# Patient Record
Sex: MALE | Race: WHITE | Employment: FULL TIME | URBAN - METROPOLITAN AREA
[De-identification: names, ages, dates, MRNs, and addresses within clinical notes are randomized per-mention and may not be internally consistent; named-entity substitution may affect disease eponyms.]

---

## 2017-02-06 ENCOUNTER — OFFICE VISIT (OUTPATIENT)
Dept: INTERNAL MEDICINE CLINIC | Age: 62
End: 2017-02-06

## 2017-02-06 VITALS
BODY MASS INDEX: 29.04 KG/M2 | WEIGHT: 214.38 LBS | HEIGHT: 72 IN | TEMPERATURE: 98.3 F | OXYGEN SATURATION: 98 % | RESPIRATION RATE: 18 BRPM | DIASTOLIC BLOOD PRESSURE: 91 MMHG | HEART RATE: 78 BPM | SYSTOLIC BLOOD PRESSURE: 124 MMHG

## 2017-02-06 DIAGNOSIS — I10 ESSENTIAL HYPERTENSION: ICD-10-CM

## 2017-02-06 DIAGNOSIS — M54.5 LOW BACK PAIN, UNSPECIFIED BACK PAIN LATERALITY, UNSPECIFIED CHRONICITY, WITH SCIATICA PRESENCE UNSPECIFIED: ICD-10-CM

## 2017-02-06 DIAGNOSIS — E11.9 TYPE 2 DIABETES MELLITUS WITH HEMOGLOBIN A1C GOAL OF LESS THAN 7.0% (HCC): Primary | ICD-10-CM

## 2017-02-06 LAB — HBA1C MFR BLD HPLC: 6.1 %

## 2017-02-06 RX ORDER — DICLOFENAC SODIUM 75 MG/1
75 TABLET, DELAYED RELEASE ORAL 2 TIMES DAILY
Qty: 50 TAB | Refills: 1 | Status: SHIPPED | OUTPATIENT
Start: 2017-02-06 | End: 2017-08-07 | Stop reason: ALTCHOICE

## 2017-02-06 NOTE — PROGRESS NOTES
HISTORY OF PRESENT ILLNESS  Licha Bradley is a 62 y.o. male. HPI  Diabetes:  He is here for follow up of diabetes. Proteinuria: no  Neuropathy: no  Medication change since last visit:  NO   Diabetic Review of Systems - medication compliance: compliant all of the time, diabetic diet compliance: compliant most of the time. Lab Results   Component Value Date/Time    Hemoglobin A1c (POC) 6.1 02/06/2017 04:30 AM     Lab Results   Component Value Date/Time    Microalb/Creat ratio (ug/mg creat.) 18.5 09/12/2016 08:33 AM    Microalbumin, urine 46.7 09/12/2016 08:33 AM         Last Point of Care HGB A1C  Hemoglobin A1c (POC)   Date Value Ref Range Status   02/06/2017 6.1 % Final        Hx of rarely recurrent low back pain. voltaren for 1-2 days during exacerbations has helped him dramatically. Using little of the year. Needs refill. ROS    Physical Exam  Visit Vitals    BP (!) 124/91 (BP 1 Location: Left arm, BP Patient Position: Sitting)    Pulse 78    Temp 98.3 °F (36.8 °C) (Oral)    Resp 18    Ht 6' 0.32\" (1.837 m)    Wt 214 lb 6 oz (97.2 kg)    SpO2 98%    BMI 28.82 kg/m2     ASSESSMENT and PLAN  Samuel Singh was seen today for diabetes. Diagnoses and all orders for this visit:    Type 2 diabetes mellitus with hemoglobin A1c goal of less than 7.0% ([facility]) - Well controlled and stable. his medications were reviewed and refilled where necessary as noted below. Labs ordered as noted. -     AMB POC HEMOGLOBIN A1C    Essential hypertension - planning 10 lb or more wt loss. Log blood pressures at home while sitting, relaxed 3-5 times weekly and bring to next visit. Pt educated on goal BP of 130/80 on average or lower. Call office as soon as possible if BP's over 140/90 or below 110/50 on multiple occasions and/or with symptoms of dizziness, chest pain, shortness of breath, headache or ankle swelling. Recheck log and bp here in 6 month(s).       Low back pain, unspecified back pain laterality, unspecified chronicity, with sciatica presence unspecified  -     diclofenac EC (VOLTAREN) 75 mg EC tablet; Take 1 Tab by mouth two (2) times a day. Follow-up Disposition:  Return in about 6 months (around 8/6/2017).

## 2017-02-06 NOTE — MR AVS SNAPSHOT
Visit Information Date & Time Provider Department Dept. Phone Encounter #  
 2/6/2017  4:15 PM Garrett Quiroz MD [facility] [address] [id number] Follow-up Instructions Return in about 6 months (around 8/6/2017). Upcoming Health Maintenance Date Due Hepatitis C Screening 1955 FOOT EXAM Q1 6/11/1966 EYE EXAM RETINAL OR DILATED Q1 6/11/1966 DTaP/Tdap/Td series (1 - Tdap) 6/11/1977 FOBT Q 1 YEAR AGE 50-75 6/11/2006 ZOSTER VACCINE AGE 60> 6/11/2016 INFLUENZA AGE 9 TO ADULT 8/1/2016 HEMOGLOBIN A1C Q6M 3/12/2017 MICROALBUMIN Q1 9/12/2017 LIPID PANEL Q1 9/12/2017 Allergies as of 2/6/2017  Review Complete On: 2/6/2017 By: Atiya Butler LPN No Known Allergies Current Immunizations  Reviewed on 10/31/2016 No immunizations on file. Not reviewed this visit You Were Diagnosed With   
  
 Codes Comments Type 2 diabetes mellitus with hemoglobin A1c goal of less than 7.0% ([facility])    -  Primary ICD-10-CM: E11.9 ICD-9-CM: 250.00 Essential hypertension     ICD-10-CM: I10 
ICD-9-CM: 401.9 Low back pain, unspecified back pain laterality, unspecified chronicity, with sciatica presence unspecified     ICD-10-CM: M54.5 ICD-9-CM: 724.2 Vitals BP Pulse Temp Resp Height(growth percentile) Weight(growth percentile) (!) 124/91 (BP 1 Location: Left arm, BP Patient Position: Sitting) 78 98.3 °F (36.8 °C) (Oral) 18 6' 0.32\" (1.837 m) 214 lb 6 oz (97.2 kg) SpO2 BMI Smoking Status 98% 28.82 kg/m2 Never Smoker Vitals History BMI and BSA Data Body Mass Index Body Surface Area  
 28.82 kg/m 2 2.23 m 2 [facility], [address] [place], [place] [address]vd AT [address] [place] [address] Drive [phone] Your Updated Medication List  
  
   
 This list is accurate as of: 2/6/17  4:41 PM.  Always use your most recent med list.  
  
  
  
  
 aspirin delayed-release 81 mg tablet Take 1 Tab by mouth daily. diclofenac EC 75 mg EC tablet Commonly known as:  VOLTAREN Take 1 Tab by mouth two (2) times a day. glucose blood VI test strips strip Commonly known as:  [address] Test Fasting Blood Sugar Once Daily. Dx E11.9  
  
 insulin glargine 100 unit/mL (3 mL) pen Commonly known as:  LANTUS SOLOSTAR  
25 Units by SubCUTAneous route once over twenty-four (24) hours. For ICD-10: E11.65 Insulin Needles (Disposable) 32 gauge x 5/32\" Ndle Commonly known as:  Jade Pen Needle For testing once a day for Dx: E11.9 Lancets Misc Commonly known as:  ACCU-CHEK FASTCLIX Test Fasting Blood Sugar Once Daily Prescriptions Sent to Pharmacy Refills  
 diclofenac EC (VOLTAREN) 75 mg EC tablet 1 Sig: Take 1 Tab by mouth two (2) times a day. Class: Normal  
 Pharmacy: [facility] [address] AT [address] [place] [address] #: [phone] Route: Oral  
  
We Performed the Following AMB POC HEMOGLOBIN A1C [17631 CPT(R)] Follow-up Instructions Return in about 6 months (around 8/6/2017). Introducing [facility] & [facility]! Dear Nabeel Harris: Thank you for requesting a [employer] account. Our records indicate that you already have an active [employer] account. You can access your account anytime at [email or web address] Did you know that you can access your hospital and ER discharge instructions at any time in Hasht[employer]? You can also review all of your test results from your hospital stay or ER visit. Additional Information If you have questions, please visit the Frequently Asked Questions section of the Hashtago website at [email or web address] Remember, MyChart is NOT to be used for urgent needs. For medical emergencies, dial 911. Now available from your iPhone and Android! Please provide this summary of care documentation to your next provider. Your primary care clinician is listed as Adamaris Mcekon. If you have any questions after today's visit, please call [phone].

## 2017-08-07 ENCOUNTER — OFFICE VISIT (OUTPATIENT)
Dept: INTERNAL MEDICINE CLINIC | Age: 62
End: 2017-08-07

## 2017-08-07 VITALS
WEIGHT: 211.25 LBS | RESPIRATION RATE: 18 BRPM | HEIGHT: 72 IN | OXYGEN SATURATION: 98 % | DIASTOLIC BLOOD PRESSURE: 93 MMHG | TEMPERATURE: 98.3 F | HEART RATE: 86 BPM | BODY MASS INDEX: 28.61 KG/M2 | SYSTOLIC BLOOD PRESSURE: 118 MMHG

## 2017-08-07 DIAGNOSIS — E11.9 TYPE 2 DIABETES MELLITUS WITH HEMOGLOBIN A1C GOAL OF LESS THAN 7.0% (HCC): Primary | ICD-10-CM

## 2017-08-07 DIAGNOSIS — R03.0 ELEVATED BP WITHOUT DIAGNOSIS OF HYPERTENSION: ICD-10-CM

## 2017-08-07 LAB — HBA1C MFR BLD HPLC: 6 %

## 2017-08-07 RX ORDER — INSULIN GLARGINE 100 [IU]/ML
25 INJECTION, SOLUTION SUBCUTANEOUS DAILY
Qty: 5 PEN | Refills: 4 | Status: SHIPPED | OUTPATIENT
Start: 2017-08-07 | End: 2017-10-25 | Stop reason: CLARIF

## 2017-08-07 NOTE — PROGRESS NOTES
HISTORY OF PRESENT ILLNESS  Luz Escalera is a 65 y.o. male. HPI  Diabetes:  He is here for follow up of diabetes. Proteinuria: no  Neuropathy: no  Medication change since last visit:  No   Diabetic Review of Systems - medication compliance: compliant all of the time, diabetic diet compliance: compliant most of the time, home glucose monitoring: is performed regularly, fasting values range 130. Hypoglycemic symptoms: no        Lab Results   Component Value Date/Time    Hemoglobin A1c (POC) 6.0 08/07/2017 04:20 AM     Lab Results   Component Value Date/Time    Microalb/Creat ratio (ug/mg creat.) 18.5 09/12/2016 08:33 AM         Last Point of Care HGB A1C  Hemoglobin A1c (POC)   Date Value Ref Range Status   08/07/2017 6.0 % Final       ROS    Physical Exam   Constitutional: He appears well-developed and well-nourished. No distress. BP (!) 118/93 (BP 1 Location: Left arm, BP Patient Position: Sitting)  Pulse 86  Temp 98.3 °F (36.8 °C) (Oral)   Resp 18  Ht 6' 0.32\" (1.837 m)  Wt 211 lb 4 oz (95.8 kg)  SpO2 98%  BMI 28.4 kg/m2Body mass index is 28.4 kg/(m^2). HENT:   Mouth/Throat: Oropharynx is clear and moist.   Neck: No JVD present. Carotid bruit is not present. Cardiovascular: Normal rate, regular rhythm, normal heart sounds and intact distal pulses. Pulmonary/Chest: Effort normal and breath sounds normal.   Musculoskeletal: He exhibits no edema. Neurological: He is alert. Skin: Skin is warm and dry. He is not diaphoretic. Nursing note and vitals reviewed. ASSESSMENT and PLAN  Diagnoses and all orders for this visit:    1. Type 2 diabetes mellitus with hemoglobin A1c goal of less than 7.0% ([facility])  -     AMB POC HEMOGLOBIN A1C  -     insulin glargine (BASAGLAR KWIKPEN) 100 unit/mL (3 mL) inpn; 25 Units by SubCUTAneous route daily. Counseled on low carb diet. Work toward wt loss. We also discussed indications for statin in diabetics for risk reduction.   He is very hesitant to add new medications at this time but will discuss again in future. 2. Elevated BP without diagnosis of hypertension - Log blood pressures at home while sitting, relaxed 3-5 times weekly and bring to next visit. Pt educated on goal BP of 130/80 on average or lower. Call office as soon as possible if BP's over 140/90 or below 110/50 on multiple occasions and/or with symptoms of dizziness, chest pain, shortness of breath, headache or ankle swelling. Recheck log and bp here in 6 month(s). Work on wt loss. Follow-up Disposition:  Return in about 6 months (around 2/7/2018).

## 2017-08-07 NOTE — MR AVS SNAPSHOT
Visit Information Date & Time Provider Department Dept. Phone Encounter #  
 8/7/2017  4:15 PM Davin Luna MD [facility] [address] [id number] Follow-up Instructions Return in about 6 months (around 2/7/2018). Upcoming Health Maintenance Date Due Hepatitis C Screening 1955 FOOT EXAM Q1 6/11/1966 EYE EXAM RETINAL OR DILATED Q1 6/11/1966 DTaP/Tdap/Td series (1 - Tdap) 6/11/1977 FOBT Q 1 YEAR AGE 50-75 6/11/2006 ZOSTER VACCINE AGE 60> 4/11/2016 INFLUENZA AGE 9 TO ADULT 8/1/2017 HEMOGLOBIN A1C Q6M 8/6/2017 MICROALBUMIN Q1 9/12/2017 LIPID PANEL Q1 9/12/2017 Allergies as of 8/7/2017  Review Complete On: 8/7/2017 By: Eugenio Jeronimo LPN No Known Allergies Current Immunizations  Reviewed on 10/31/2016 No immunizations on file. Not reviewed this visit You Were Diagnosed With   
  
 Codes Comments Type 2 diabetes mellitus with hemoglobin A1c goal of less than 7.0% ([facility])    -  Primary ICD-10-CM: E11.9 ICD-9-CM: 250.00 Elevated BP without diagnosis of hypertension     ICD-10-CM: R03.0 ICD-9-CM: 796.2 Vitals BP Pulse Temp Resp Height(growth percentile) Weight(growth percentile) (!) 118/93 (BP 1 Location: Left arm, BP Patient Position: Sitting) 86 98.3 °F (36.8 °C) (Oral) 18 6' 0.32\" (1.837 m) 211 lb 4 oz (95.8 kg) SpO2 BMI Smoking Status 98% 28.4 kg/m2 Never Smoker Vitals History BMI and BSA Data Body Mass Index Body Surface Area  
 28.4 kg/m 2 2.21 m 2 Preferred Pharmacy Pharmacy Name Phone [facility] DRUG STORE [facility], [address] [place] [address] AT [facility] [address] [phone] Your Updated Medication List  
  
   
This list is accurate as of: 8/7/17  5:04 PM.  Always use your most recent med list.  
  
  
  
  
 aspirin delayed-release 81 mg tablet Take 1 Tab by mouth daily. glucose blood VI test strips strip Commonly known as:  [address] Test Fasting Blood Sugar Once Daily. Dx E11.9  
  
 insulin glargine 100 unit/mL (3 mL) Inpn Commonly known as:  BASAGLAR KWIKPEN  
25 Units by SubCUTAneous route daily. Lancets Misc Commonly known as:  ACCU-CHEK FASTCLIX Test Fasting Blood Sugar Once Daily Jade Pen Needle 32 gauge x 5/32\" Ndle Generic drug:  Insulin Needles (Disposable) USE ONCE A DAY AS DIRECTED Prescriptions Sent to Pharmacy Refills  
 insulin glargine (BASAGLAR KWIKPEN) 100 unit/mL (3 mL) inpn 4 Si Units by SubCUTAneous route daily. Class: Normal  
 [facility]  AT [address] Ph #: [phone] Route: SubCUTAneous We Performed the Following AMB POC HEMOGLOBIN A1C [72463 CPT(R)] Follow-up Instructions Return in about 6 months (around 2018). Introducing [facility]! Dear Carey Scott: Thank you for requesting a [employer] account. Our records indicate that you already have an active [employer] account. You can access your account anytime at [email or web address] Did you know that you can access your hospital and ER discharge instructions at any time in [employer]? You can also review all of your test results from your hospital stay or ER visit. Additional Information If you have questions, please visit the Frequently Asked Questions section of the [employer] website at [email or web address] Remember, [employer] is NOT to be used for urgent needs. For medical emergencies, dial 911. Now available from your iPhone and Android! Please provide this summary of care documentation to your next provider. Your primary care clinician is listed as Abimbola Almeida. If you have any questions after today's visit, please call [phone].

## 2017-09-23 ENCOUNTER — HOSPITAL ENCOUNTER (EMERGENCY)
Age: 62
Discharge: HOME | End: 2017-09-23
Attending: FAMILY MEDICINE

## 2017-09-23 VITALS
HEIGHT: 72 IN | WEIGHT: 211 LBS | TEMPERATURE: 97.5 F | BODY MASS INDEX: 28.58 KG/M2 | SYSTOLIC BLOOD PRESSURE: 131 MMHG | HEART RATE: 88 BPM | OXYGEN SATURATION: 94 % | RESPIRATION RATE: 16 BRPM | DIASTOLIC BLOOD PRESSURE: 80 MMHG

## 2017-09-23 DIAGNOSIS — M10.071 ACUTE IDIOPATHIC GOUT OF RIGHT FOOT: Primary | ICD-10-CM

## 2017-09-23 RX ORDER — INDOMETHACIN 50 MG/1
50 CAPSULE ORAL
Qty: 30 CAP | Refills: 0 | Status: SHIPPED | OUTPATIENT
Start: 2017-09-23 | End: 2017-10-31 | Stop reason: ALTCHOICE

## 2017-09-23 RX ORDER — COLCHICINE 0.6 MG/1
CAPSULE ORAL
Qty: 3 CAP | Refills: 0 | Status: SHIPPED | OUTPATIENT
Start: 2017-09-23 | End: 2017-10-31 | Stop reason: SDUPTHER

## 2017-09-23 NOTE — DISCHARGE INSTRUCTIONS
Gout: Care Instructions  Your Care Instructions  Gout is a form of arthritis caused by a buildup of uric acid crystals in a joint. It causes sudden attacks of pain, swelling, redness, and stiffness, usually in one joint, especially the big toe. Gout usually comes on without a cause. But it can be brought on by drinking alcohol (especially beer) or eating seafood and red meat. Taking certain medicines, such as diuretics or aspirin, also can bring on an attack of gout. Taking your medicines as prescribed and following up with your doctor regularly can help you avoid gout attacks in the future. Follow-up care is a key part of your treatment and safety. Be sure to make and go to all appointments, and call your doctor if you are having problems. Its also a good idea to know your test results and keep a list of the medicines you take. How can you care for yourself at home? · If the joint is swollen, put ice or a cold pack on the area for 10 to 20 minutes at a time. Put a thin cloth between the ice and your skin. · Prop up the sore limb on a pillow when you ice it or anytime you sit or lie down during the next 3 days. Try to keep it above the level of your heart. This will help reduce swelling. · Rest sore joints. Avoid activities that put weight or strain on the joints for a few days. Take short rest breaks from your regular activities during the day. · Take your medicines exactly as prescribed. Call your doctor if you think you are having a problem with your medicine. · Take pain medicines exactly as directed. ¨ If the doctor gave you a prescription medicine for pain, take it as prescribed. ¨ If you are not taking a prescription pain medicine, ask your doctor if you can take an over-the-counter medicine. · Eat less seafood and red meat. · Check with your doctor before drinking alcohol. · Losing weight, if you are overweight, may help reduce attacks of gout. But do not go on a [employer]. \" Losing a lot of weight in a short amount of time can cause a gout attack. When should you call for help? Call your doctor now or seek immediate medical care if:  · You have a fever. · The joint is so painful you cannot use it. · You have sudden, unexplained swelling, redness, warmth, or severe pain in one or more joints. Watch closely for changes in your health, and be sure to contact your doctor if:  · You have joint pain. · Your symptoms get worse or are not improving after 2 or 3 days. Where can you learn more? Go to http://elizabeth-luke.info/. Enter U310 in the search box to learn more about \"Gout: Care Instructions. \"  Current as of: October 31, 2016  Content Version: 11.3  © 7834-0579 [employer]. Care instructions adapted under license by [employer] (which disclaims liability or warranty for this information). If you have questions about a medical condition or this instruction, always ask your healthcare professional. [place] [address] any warranty or liability for your use of this information. Purine-Restricted Diet: Care Instructions  Your Care Instructions  Purines are substances that are found in some foods. Your body turns purines into uric acid. High levels of uric acid can cause gout, which is a form of arthritis that causes pain and inflammation in joints. You may be able to help control the amount of uric acid in your body by limiting high-purine foods in your diet. Follow-up care is a key part of your treatment and safety. Be sure to make and go to all appointments, and call your doctor if you are having problems. It's also a good idea to know your test results and keep a list of the medicines you take. How can you care for yourself at home? · Plan your meals and snacks around foods that are low in purines and are safe for you to eat. These foods include:  ¨ Green vegetables and tomatoes. ¨ Fruits.   ¨ Whole-grain breads, rice, and cereals. ¨ Eggs, peanut butter, and nuts. ¨ Low-fat milk, cheese, and other milk products. ¨ Popcorn. ¨ Gelatin desserts, chocolate, cocoa, and cakes and sweets, in small amounts. · You can eat certain foods that are medium-high in purines, but eat them only once in a while. These foods include:  ¨ Legumes, such as dried beans and dried peas. You can have 1 cup cooked legumes each day. ¨ Asparagus, cauliflower, spinach, mushrooms, and green peas. ¨ Fish and seafood (other than very high-purine seafood). ¨ Oatmeal, wheat bran, and wheat germ. · Limit very high-purine foods, including:  ¨ Organ meats, such as liver, kidneys, sweetbreads, and brains. ¨ Meats, including brown, beef, pork, and lamb. ¨ Game meats and any other meats in large amounts. ¨ Anchovies, sardines, herring, mackerel, and scallops. ¨ Gravy. ¨ Beer. Where can you learn more? Go to http://elizabeth-luke.info/. Enter F448 in the search box to learn more about \"Purine-Restricted Diet: Care Instructions. \"  Current as of: July 26, 2016  Content Version: 11.3  © 0658-4442 [employer]. Care instructions adapted under license by [employer] (which disclaims liability or warranty for this information). If you have questions about a medical condition or this instruction, always ask your healthcare professional. [place] [address] any warranty or liability for your use of this information.

## 2017-09-24 NOTE — UC PROVIDER NOTE
Patient is a 65 y.o. male presenting with foot pain. The history is provided by the patient. Foot Pain    This is a recurrent (hx of gout; ate a dozen oysters prior to onset) problem. The current episode started yesterday. The problem occurs constantly. The problem has been gradually worsening. Pain location: right MTP joint. The quality of the pain is described as aching. The pain is moderate. Associated symptoms include limited range of motion and stiffness. The symptoms are aggravated by movement and contact. He has tried nothing for the symptoms. There has been no history of extremity trauma. Past Medical History:   Diagnosis Date    Diabetes ([facility]     Hypertension         Past Surgical History:   Procedure Laterality Date    HX HERNIA REPAIR      R inguinal         Family History   Problem Relation Age of Onset    Diabetes Father     Hypertension Father     Cancer Mother      breast/lung    Hypertension Sister     Hypertension Brother     Elevated Lipids Neg Hx         Social History     Social History    Marital status:      Spouse name: N/A    Number of children: 3    Years of education: N/A     Occupational History    Not on file. Social History Main Topics    Smoking status: Never Smoker    Smokeless tobacco: Current User     Types: Snuff      Comment: dip/everyday     Alcohol use 0.0 oz/week     0 Standard drinks or equivalent per week      Comment: rarely     Drug use: No    Sexual activity: Yes     Partners: Female     Other Topics Concern    Not on file     Social History Narrative                ALLERGIES: Review of patient's allergies indicates no known allergies. Review of Systems   Constitutional: Negative for chills and fever. Respiratory: Negative for shortness of breath and wheezing. Cardiovascular: Negative for chest pain and palpitations. Gastrointestinal: Negative for nausea and vomiting.    Musculoskeletal: Positive for arthralgias, joint swelling and stiffness. Skin: Positive for color change. Neurological: Negative for dizziness and headaches. Vitals:    09/23/17 1944   BP: 131/80   Pulse: 88   Resp: 16   Temp: 97.5 °F (36.4 °C)   SpO2: 94%   Weight: 95.7 kg (211 lb)   Height: 6' (1.829 m)       Physical Exam   Constitutional: He appears well-developed and well-nourished. No distress. Musculoskeletal:        Right foot: There is decreased range of motion (1st MTP joint), tenderness (1st MTP joint), bony tenderness (1st MTP joint) and swelling (1st MTP joint). There is normal capillary refill, no crepitus, no deformity and no laceration. Feet:    Neurological: He is alert. Skin: He is not diaphoretic. Psychiatric: He has a normal mood and affect. His behavior is normal. Judgment and thought content normal.   Nursing note and vitals reviewed. MDM     Differential Diagnosis; Clinical Impression; Plan:     CLINICAL IMPRESSION:  Acute idiopathic gout of right foot  (primary encounter diagnosis)    Plan:  1. Colchicine  2. Indocin prn  3. F/u with pcp  Risk of Significant Complications, Morbidity, and/or Mortality:   Presenting problems: Moderate  Management options:   Moderate  Progress:   Patient progress:  Stable      Procedures

## 2017-10-25 ENCOUNTER — TELEPHONE (OUTPATIENT)
Dept: INTERNAL MEDICINE CLINIC | Age: 62
End: 2017-10-25

## 2017-10-25 RX ORDER — INSULIN GLARGINE 100 [IU]/ML
25 INJECTION, SOLUTION SUBCUTANEOUS DAILY
Qty: 15 ML | Refills: 1 | Status: SHIPPED | OUTPATIENT
Start: 2017-10-25 | End: 2018-10-26 | Stop reason: SDUPTHER

## 2017-10-25 NOTE — TELEPHONE ENCOUNTER
Per pharmacist, Basaglar 100 units/mL is not coved by patients insurance. Prior authorization was denied by [employer]. New medication alternative recommended.

## 2017-10-31 ENCOUNTER — OFFICE VISIT (OUTPATIENT)
Dept: INTERNAL MEDICINE CLINIC | Age: 62
End: 2017-10-31

## 2017-10-31 VITALS
BODY MASS INDEX: 28.63 KG/M2 | SYSTOLIC BLOOD PRESSURE: 134 MMHG | HEIGHT: 73 IN | TEMPERATURE: 98.6 F | HEART RATE: 84 BPM | DIASTOLIC BLOOD PRESSURE: 88 MMHG | RESPIRATION RATE: 18 BRPM | OXYGEN SATURATION: 97 % | WEIGHT: 216 LBS

## 2017-10-31 DIAGNOSIS — Z12.11 COLON CANCER SCREENING: ICD-10-CM

## 2017-10-31 DIAGNOSIS — Z00.00 PREVENTATIVE HEALTH CARE: Primary | ICD-10-CM

## 2017-10-31 DIAGNOSIS — Z11.59 ENCOUNTER FOR HEPATITIS C SCREENING TEST FOR LOW RISK PATIENT: ICD-10-CM

## 2017-10-31 DIAGNOSIS — I10 ESSENTIAL HYPERTENSION: ICD-10-CM

## 2017-10-31 DIAGNOSIS — E11.9 TYPE 2 DIABETES MELLITUS WITH HEMOGLOBIN A1C GOAL OF LESS THAN 7.0% (HCC): ICD-10-CM

## 2017-10-31 RX ORDER — COLCHICINE 0.6 MG/1
TABLET ORAL
Refills: 0 | COMMUNITY
Start: 2017-09-23 | End: 2017-10-31 | Stop reason: ALTCHOICE

## 2017-10-31 NOTE — MR AVS SNAPSHOT
Visit Information Date & Time Provider Department Dept. Phone Encounter #  
 10/31/2017  2:30 PM Trupti Mendoza MD [facility] DONAVAN Ramos [id number] Follow-up Instructions Return in about 4 months (around 2/28/2018). Your Appointments 2/6/2018  4:00 PM  
ROUTINE CARE with Trupti Mendoza MD  
[facility] Appt Note: [address] [id number]  
[phone]  
  
   
 [address] [facility] [address] Upcoming Health Maintenance Date Due Hepatitis C Screening 1955 FOOT EXAM Q1 6/11/1966 EYE EXAM RETINAL OR DILATED Q1 6/11/1966 COLONOSCOPY 6/11/1974 DTaP/Tdap/Td series (1 - Tdap) 6/11/1977 MICROALBUMIN Q1 9/12/2017 LIPID PANEL Q1 9/12/2017 HEMOGLOBIN A1C Q6M 2/7/2018 Allergies as of 10/31/2017  Review Complete On: 10/31/2017 By: Trupti Mendoza MD  
 No Known Allergies Current Immunizations  Reviewed on 10/31/2017 No immunizations on file. Reviewed by Trupti Mendoza MD on 10/31/2017 at  2:50 PM  
 Reviewed by Trupti Mendoza MD on 10/31/2017 at  2:54 PM  
You Were Diagnosed With   
  
 Codes Comments Preventative health care    -  Primary ICD-10-CM: Z00.00 ICD-9-CM: V70.0 Type 2 diabetes mellitus with hemoglobin A1c goal of less than 7.0% (HCC)     ICD-10-CM: E11.9 ICD-9-CM: 250.00 Essential hypertension     ICD-10-CM: I10 
ICD-9-CM: 401.9 Encounter for hepatitis C screening test for low risk patient     ICD-10-CM: Z11.59 
ICD-9-CM: V73.89 Colon cancer screening     ICD-10-CM: Z12.11 ICD-9-CM: V76.51 Vitals BP Pulse Temp Resp Height(growth percentile) Weight(growth percentile) 134/88 84 98.6 °F (37 °C) (Oral) 18 6' 1\" (1.854 m) 216 lb (98 kg) SpO2 BMI Smoking Status 97% 28.5 kg/m2 Never Smoker Vitals History BMI and BSA Data Body Mass Index Body Surface Area 28.5 kg/m 2 2.25 m 2 Preferred Pharmacy Pharmacy Name Phone [facility], [address] AT [address] [place] [address] Drive [phone] Your Updated Medication List  
  
   
This list is accurate as of: 10/31/17  3:08 PM.  Always use your most recent med list.  
  
  
  
  
 aspirin delayed-release 81 mg tablet Take 1 Tab by mouth daily. glucose blood VI test strips strip Commonly known as:  [address] Test Fasting Blood Sugar Once Daily. Dx E11.9  
  
 insulin glargine 100 unit/mL (3 mL) Inpn Commonly known as:  LANTUS,BASAGLAR  
25 Units by SubCUTAneous route daily. Lancets Misc Commonly known as:  ACCU-CHEK FASTCLIX Test Fasting Blood Sugar Once Daily Jade Pen Needle 32 gauge x 5/32\" Ndle Generic drug:  Insulin Needles (Disposable) USE ONCE A DAY AS DIRECTED We Performed the Following HEMOGLOBIN A1C WITH EAG [08150 CPT(R)] HEPATITIS C AB [46251 CPT(R)] LIPID PANEL [47353 CPT(R)] METABOLIC PANEL, BASIC [38796 CPT(R)] MICROALBUMIN, UR, RAND W/ MICROALBUMIN/CREA RATIO [id number] CPT(R)] PSA, DIAGNOSTIC (PROSTATE SPECIFIC AG) [id number] CPT(R)] REFERRAL TO GASTROENTEROLOGY [GXX51 Custom] Follow-up Instructions Return in about 4 months (around 2/28/2018). Referral Information Referral ID Referred By Referred To  
  
 [id number] [facility], [address] [address] [place], [address] Visits Status Start Date End Date 1 New Request 10/31/17 10/31/18 If your referral has a status of pending review or denied, additional information will be sent to support the outcome of this decision. Patient Instructions   
[employer] Well Visit, Men 48 to 72: Care Instructions Your Care Instructions Physical exams can help you stay healthy. Your doctor has checked your overall health and may have suggested ways to take good care of yourself. He or she also may have recommended tests. At home, you can help prevent illness with healthy eating, regular exercise, and other steps. Follow-up care is a key part of your treatment and safety. Be sure to make and go to all appointments, and call your doctor if you are having problems. It's also a good idea to know your test results and keep a list of the medicines you take. How can you care for yourself at home? · Reach and stay at a healthy weight. This will lower your risk for many problems, such as obesity, diabetes, heart disease, and high blood pressure. · Get at least 30 minutes of exercise on most days of the week. Walking is a good choice. You also may want to do other activities, such as running, swimming, cycling, or playing tennis or team sports. · Do not smoke. Smoking can make health problems worse. If you need help quitting, talk to your doctor about stop-smoking programs and medicines. These can increase your chances of quitting for good. · Protect your skin from too much sun. When you're outdoors from 10 a.m. to 4 p.m., stay in the shade or cover up with clothing and a hat with a wide brim. Wear sunglasses that block UV rays. Even when it's cloudy, put broad-spectrum sunscreen (SPF 30 or higher) on any exposed skin. · See a dentist one or two times a year for checkups and to have your teeth cleaned. · Wear a seat belt in the car. · Limit alcohol to 2 drinks a day. Too much alcohol can cause health problems. Follow your doctor's advice about when to have certain tests. These tests can spot problems early. · Cholesterol. Your doctor will tell you how often to have this done based on your overall health and other things that can increase your risk for heart attack and stroke. · Blood pressure. Have your blood pressure checked during a routine doctor visit. Your doctor will tell you how often to check your blood pressure based on your age, your blood pressure results, and other factors. · Prostate exam. Talk to your doctor about whether you should have a blood test (called a PSA test) for prostate cancer. Experts disagree on whether men should have this test. Some experts recommend that you discuss the benefits and risks of the test with your doctor. · Diabetes. Ask your doctor whether you should have tests for diabetes. · Vision. Some experts recommend that you have yearly exams for glaucoma and other age-related eye problems starting at age 48. · Hearing. Tell your doctor if you notice any change in your hearing. You can have tests to find out how well you hear. · Colon cancer. You should begin tests for colon cancer at age 48. You may have one of several tests. Your doctor will tell you how often to have tests based on your age and risk. Risks include whether you already had a precancerous polyp removed from your colon or whether your parent, brother, sister, or child has had colon cancer. · Heart attack and stroke risk. At least every 4 to 6 years, you should have your risk for heart attack and stroke assessed. Your doctor uses factors such as your age, blood pressure, cholesterol, and whether you smoke or have diabetes to show what your risk for a heart attack or stroke is over the next 10 years. · Abdominal aortic aneurysm. Ask your doctor whether you should have a test to check for an aneurysm. You may need a test if you ever smoked or if your parent, brother, sister, or child has had an aneurysm. When should you call for help? Watch closely for changes in your health, and be sure to contact your doctor if you have any problems or symptoms that concern you. Where can you learn more? Go to http://elizabeth-luke.info/. Enter O670 in the search box to learn more about \"Well Visit, Men 48 to 72: Care Instructions. \" Current as of: May 12, 2017 Content Version: 11.4 © 0132-1272 [employer] [place]. Care instructions adapted under license by [employer] (which disclaims liability or warranty for this information). If you have questions about a medical condition or this instruction, always ask your healthcare professional. [address] any warranty or liability for your use of this information. Introducing \[facility] [place] [facility]\"" & HEALTH SERVICES! Dear Shaquille Pat: Thank you for requesting a NuPathe account. Our records indicate that you already have an active NuPathe account. You can access your account anytime at [email or web address] Did you know that you can access your hospital and ER discharge instructions at any time in NuPathe? You can also review all of your test results from your hospital stay or ER visit. Additional Information If you have questions, please visit the Frequently Asked Questions section of the NuPathe website at [email or web address] Remember, NuPathe is NOT to be used for urgent needs. For medical emergencies, dial 911. Now available from your iPhone and Android! Please provide this summary of care documentation to your next provider. Your primary care clinician is listed as Emerald Adame. If you have any questions after today's visit, please call [phone].

## 2017-10-31 NOTE — PATIENT INSTRUCTIONS
mobiliThink     Well Visit, Men 48 to 72: Care Instructions  Your Care Instructions    Physical exams can help you stay healthy. Your doctor has checked your overall health and may have suggested ways to take good care of yourself. He or she also may have recommended tests. At home, you can help prevent illness with healthy eating, regular exercise, and other steps. Follow-up care is a key part of your treatment and safety. Be sure to make and go to all appointments, and call your doctor if you are having problems. It's also a good idea to know your test results and keep a list of the medicines you take. How can you care for yourself at home? · Reach and stay at a healthy weight. This will lower your risk for many problems, such as obesity, diabetes, heart disease, and high blood pressure. · Get at least 30 minutes of exercise on most days of the week. Walking is a good choice. You also may want to do other activities, such as running, swimming, cycling, or playing tennis or team sports. · Do not smoke. Smoking can make health problems worse. If you need help quitting, talk to your doctor about stop-smoking programs and medicines. These can increase your chances of quitting for good. · Protect your skin from too much sun. When you're outdoors from 10 a.m. to 4 p.m., stay in the shade or cover up with clothing and a hat with a wide brim. Wear sunglasses that block UV rays. Even when it's cloudy, put broad-spectrum sunscreen (SPF 30 or higher) on any exposed skin. · See a dentist one or two times a year for checkups and to have your teeth cleaned. · Wear a seat belt in the car. · Limit alcohol to 2 drinks a day. Too much alcohol can cause health problems. Follow your doctor's advice about when to have certain tests. These tests can spot problems early. · Cholesterol.  Your doctor will tell you how often to have this done based on your overall health and other things that can increase your risk for heart attack and stroke. · Blood pressure. Have your blood pressure checked during a routine doctor visit. Your doctor will tell you how often to check your blood pressure based on your age, your blood pressure results, and other factors. · Prostate exam. Talk to your doctor about whether you should have a blood test (called a PSA test) for prostate cancer. Experts disagree on whether men should have this test. Some experts recommend that you discuss the benefits and risks of the test with your doctor. · Diabetes. Ask your doctor whether you should have tests for diabetes. · Vision. Some experts recommend that you have yearly exams for glaucoma and other age-related eye problems starting at age 48. · Hearing. Tell your doctor if you notice any change in your hearing. You can have tests to find out how well you hear. · Colon cancer. You should begin tests for colon cancer at age 48. You may have one of several tests. Your doctor will tell you how often to have tests based on your age and risk. Risks include whether you already had a precancerous polyp removed from your colon or whether your parent, brother, sister, or child has had colon cancer. · Heart attack and stroke risk. At least every 4 to 6 years, you should have your risk for heart attack and stroke assessed. Your doctor uses factors such as your age, blood pressure, cholesterol, and whether you smoke or have diabetes to show what your risk for a heart attack or stroke is over the next 10 years. · Abdominal aortic aneurysm. Ask your doctor whether you should have a test to check for an aneurysm. You may need a test if you ever smoked or if your parent, brother, sister, or child has had an aneurysm. When should you call for help? Watch closely for changes in your health, and be sure to contact your doctor if you have any problems or symptoms that concern you. Where can you learn more? Go to http://elizabeth-luke.info/.   Enter L585 in the search box to learn more about \"Well Visit, Men 48 to 72: Care Instructions. \"  Current as of: May 12, 2017  Content Version: 11.4  © 0991-8435 [employer]. Care instructions adapted under license by [employer] (which disclaims liability or warranty for this information). If you have questions about a medical condition or this instruction, always ask your healthcare professional. [place] [address] any warranty or liability for your use of this information.

## 2017-10-31 NOTE — PROGRESS NOTES
HISTORY OF PRESENT ILLNESS  Juana Cardenas is a 65 y.o. male. HPI  Juana Cardenas is here for complete health maintenance physical exam and screening. he does not have other concerns. Health maintenance hx includes:  Exercise: moderately active. Form of exercise: hunting, walking   Diet: generally follows a low fat low cholesterol diet, generally follows a low sodium diet, follows a diabetic diet regularly, exercises sporadically, nonsmoker    Cancer screening:    Colon cancer screening:  Last Colonoscopy: never   Prostate cancer screening: PSA and/or WAGNER:   Lab Results   Component Value Date/Time    Prostate Specific Ag 0.7 10/31/2016 12:00 PM    Prostate Specific Ag 0.5 09/04/2015 11:12 AM    Prostate Specific Ag 0.6 12/20/2012 12:00 AM          Lab Results   Component Value Date/Time    Cholesterol, total 188 09/12/2016 08:33 AM    HDL Cholesterol 43 09/12/2016 08:33 AM    LDL, calculated 126 09/12/2016 08:33 AM    VLDL, calculated 19 09/12/2016 08:33 AM    Triglyceride 96 09/12/2016 08:33 AM       Lab Results   Component Value Date/Time    Glucose 100 09/12/2016 08:33 AM    Glucose  10/09/2015 09:30 AM         Immunizations: There is no immunization history on file for this patient. Immunization status: pt declines vaccines. Social History     Social History    Marital status:      Spouse name: N/A    Number of children: 3    Years of education: N/A     Occupational History    Not on file.      Social History Main Topics    Smoking status: Never Smoker    Smokeless tobacco: Current User     Types: Snuff      Comment: dip/everyday     Alcohol use 0.0 oz/week     0 Standard drinks or equivalent per week      Comment: rarely     Drug use: No    Sexual activity: Yes     Partners: Female     Other Topics Concern    Not on file     Social History Narrative     Past Surgical History:   Procedure Laterality Date    HX HERNIA REPAIR      R inguinal     Family History   Problem Relation Age of Onset   24 [facility] Diabetes Father     Hypertension Father     Cancer Mother      breast/lung    Hypertension Sister     Hypertension Brother     Elevated Lipids Neg Hx      Current Outpatient Prescriptions on File Prior to Visit   Medication Sig Dispense Refill    insulin glargine (LANTUS,BASAGLAR) 100 unit/mL (3 mL) inpn 25 Units by SubCUTAneous route daily. 15 mL 1    MEKA PEN NEEDLE 32 gauge x 5/32\" ndle USE ONCE A DAY AS DIRECTED 100 Pen Needle 11    glucose blood VI test strips (ACCU-CHEK SMARTVIEW TEST STRIP) strip Test Fasting Blood Sugar Once Daily. Dx E11.9 100 Strip 11    Lancets (ACCU-CHEK FASTCLIX) misc Test Fasting Blood Sugar Once Daily 100 Each 11    aspirin delayed-release 81 mg tablet Take 1 Tab by mouth daily. No current facility-administered medications on file prior to visit. .    Review of Systems   Constitutional: Negative for malaise/fatigue and weight loss. Eyes: Negative for blurred vision and pain. Respiratory: Negative for cough, shortness of breath and wheezing. Cardiovascular: Negative for chest pain, palpitations and leg swelling. Gastrointestinal: Negative for blood in stool, constipation, diarrhea, heartburn, nausea and vomiting. Genitourinary: Negative. Musculoskeletal: Negative for back pain, joint pain and myalgias. Skin: Negative for rash. Neurological: Negative for dizziness and headaches. Endo/Heme/Allergies: Negative for environmental allergies. Does not bruise/bleed easily. Psychiatric/Behavioral: Negative for depression. The patient is not nervous/anxious and does not have insomnia. Physical Exam   Constitutional: He is oriented to person, place, and time. He appears well-developed and well-nourished. No distress. Body mass index is 28.5 kg/(m^2). /88  Pulse 84  Temp 98.6 °F (37 °C) (Oral)   Resp 18  Ht 6' 1\" (1.854 m)  Wt 216 lb (98 kg)  SpO2 97%  BMI 28.5 kg/m2   HENT:   Head: Normocephalic and atraumatic. Right Ear: Hearing, tympanic membrane and ear canal normal.   Left Ear: Hearing, tympanic membrane and ear canal normal.   Nose: Nose normal.   Mouth/Throat: Oropharynx is clear and moist and mucous membranes are normal. Normal dentition. Eyes: Conjunctivae and lids are normal. Pupils are equal, round, and reactive to light. Right eye exhibits no discharge. Left eye exhibits no discharge. No scleral icterus. Neck: Trachea normal. No thyromegaly present. Cardiovascular: Normal rate, regular rhythm, normal heart sounds, intact distal pulses and normal pulses. Exam reveals no gallop and no friction rub. No murmur heard. Pulmonary/Chest: Effort normal and breath sounds normal. No respiratory distress. Abdominal: Soft. Normal appearance and bowel sounds are normal. He exhibits no distension and no mass. There is no hepatosplenomegaly. There is no tenderness. There is no CVA tenderness. Musculoskeletal: Normal range of motion. He exhibits no edema or tenderness. Lymphadenopathy:     He has no cervical adenopathy. Right: No inguinal adenopathy present. Left: No inguinal adenopathy present. Neurological: He is alert and oriented to person, place, and time. Skin: Skin is warm and dry. No rash noted. He is not diaphoretic. Psychiatric: He has a normal mood and affect. His speech is normal and behavior is normal. Judgment and thought content normal. Cognition and memory are normal.   Nursing note and vitals reviewed. ASSESSMENT and PLAN  Diagnoses and all orders for this visit:    1. [facility] health care  -     LIPID PANEL  -     METABOLIC PANEL, [address] [facility]  he was advised to have follow up colonoscopy in 2017  Doris Yue was counseled on age-appropriate/ guideline-based risk prevention behaviors and screening for a 65y.o. year old   male . We also discussed adjustments in screening based on family history if necessary.    Printed instructions for preventative screening guidelines and healthy behaviors given to patient with after visit summary. 2. Type 2 diabetes mellitus with hemoglobin A1c goal of less than 7.0% ([facility]) -recheck labs  -     MICROALBUMIN, UR, RAND W/ MICROALBUMIN/CREA RATIO  -     HEMOGLOBIN A1C WITH EAG    3. Essential hypertension - fair control on no medication. The patient is advised to begin progressive daily aerobic exercise program, follow a low fat, low cholesterol diet and attempt to lose weight. .   Log blood pressures at home while sitting, relaxed 3-5 times weekly and bring to next visit. Pt educated on goal BP of 130/80 on average or lower. Call office as soon as possible if BP's over 140/90 or below 110/50 on multiple occasions and/or with symptoms of dizziness, chest pain, shortness of breath, headache or ankle swelling. Recheck log and bp here in 4 month(s).    -     METABOLIC PANEL, BASIC    4. Encounter for hepatitis C screening test for low risk patient  -     HEPATITIS C AB    5. Colon cancer screening  -     Zaida Jones [facility]      Follow-up Disposition:  Return in about 4 months (around 2/28/2018).

## 2017-11-02 ENCOUNTER — TELEPHONE (OUTPATIENT)
Dept: INTERNAL MEDICINE CLINIC | Age: 62
End: 2017-11-02

## 2017-11-02 NOTE — TELEPHONE ENCOUNTER
Pt states that he was in here for a physical and forgot to get a letter stating he had his physical for his job. They would like a letter written stating that and have it mailed to them.

## 2017-11-02 NOTE — LETTER
11/2/2017 5:01 PM 
 
Mr. Ellyn Contreras [address] [place] [id number] To whom it may concern: 
 
 
 
Ellyn Contreras had his annual comprehensive health maintenance physical exam on 10/31/17. Sincerely, Renu Chavez MD

## 2018-02-04 LAB
ALBUMIN/CREAT UR: 14.9 MG/G CREAT (ref 0–30)
BUN SERPL-MCNC: 18 MG/DL (ref 8–27)
BUN/CREAT SERPL: 15 (ref 10–24)
CALCIUM SERPL-MCNC: 9.3 MG/DL (ref 8.6–10.2)
CHLORIDE SERPL-SCNC: 105 MMOL/L (ref 96–106)
CHOLEST SERPL-MCNC: 186 MG/DL (ref 100–199)
CO2 SERPL-SCNC: 24 MMOL/L (ref 18–29)
CREAT SERPL-MCNC: 1.23 MG/DL (ref 0.76–1.27)
CREAT UR-MCNC: 256.7 MG/DL
EST. AVERAGE GLUCOSE BLD GHB EST-MCNC: 131 MG/DL
GFR SERPLBLD CREATININE-BSD FMLA CKD-EPI: 63 ML/MIN/1.73
GFR SERPLBLD CREATININE-BSD FMLA CKD-EPI: 73 ML/MIN/1.73
GLUCOSE SERPL-MCNC: 111 MG/DL (ref 65–99)
HBA1C MFR BLD: 6.2 % (ref 4.8–5.6)
HCV AB S/CO SERPL IA: <0.1 S/CO RATIO (ref 0–0.9)
HDLC SERPL-MCNC: 41 MG/DL
INTERPRETATION, 910389: NORMAL
LDLC SERPL CALC-MCNC: 118 MG/DL (ref 0–99)
Lab: NORMAL
MICROALBUMIN UR-MCNC: 38.2 UG/ML
POTASSIUM SERPL-SCNC: 4.6 MMOL/L (ref 3.5–5.2)
PSA SERPL-MCNC: 0.6 NG/ML (ref 0–4)
SODIUM SERPL-SCNC: 144 MMOL/L (ref 134–144)
TRIGL SERPL-MCNC: 135 MG/DL (ref 0–149)
VLDLC SERPL CALC-MCNC: 27 MG/DL (ref 5–40)

## 2018-03-01 ENCOUNTER — OFFICE VISIT (OUTPATIENT)
Dept: INTERNAL MEDICINE CLINIC | Age: 63
End: 2018-03-01

## 2018-03-01 VITALS
HEIGHT: 73 IN | WEIGHT: 215.38 LBS | RESPIRATION RATE: 18 BRPM | DIASTOLIC BLOOD PRESSURE: 89 MMHG | HEART RATE: 74 BPM | SYSTOLIC BLOOD PRESSURE: 136 MMHG | OXYGEN SATURATION: 95 % | TEMPERATURE: 98.6 F | BODY MASS INDEX: 28.54 KG/M2

## 2018-03-01 DIAGNOSIS — E78.5 DYSLIPIDEMIA: ICD-10-CM

## 2018-03-01 DIAGNOSIS — E11.9 TYPE 2 DIABETES MELLITUS WITH HEMOGLOBIN A1C GOAL OF LESS THAN 7.0% (HCC): Primary | ICD-10-CM

## 2018-03-01 LAB — HBA1C MFR BLD HPLC: 6.1 %

## 2018-03-01 RX ORDER — ATORVASTATIN CALCIUM 10 MG/1
10 TABLET, FILM COATED ORAL DAILY
Qty: 30 TAB | Refills: 2 | Status: SHIPPED | OUTPATIENT
Start: 2018-03-01 | End: 2018-05-30 | Stop reason: SDUPTHER

## 2018-03-01 NOTE — MR AVS SNAPSHOT
[address] [facility] [address] 
 
 
 [address] 
[phone] Patient: Bill Peralta MRN: [id number] PTM:1/67/3793 Visit Information Date & Time Provider Department Dept. Phone Encounter #  
 3/1/2018  4:15 PM Mojgan Holland MD [facility] [address] [facility] [id number] Follow-up Instructions Return in about 3 months (around 6/1/2018). Upcoming Health Maintenance Date Due  
 FOOT EXAM Q1 6/11/1966 EYE EXAM RETINAL OR DILATED Q1 6/11/1966 COLONOSCOPY 6/11/1974 DTaP/Tdap/Td series (1 - Tdap) 6/11/1977 HEMOGLOBIN A1C Q6M 8/3/2018 MICROALBUMIN Q1 2/3/2019 LIPID PANEL Q1 2/3/2019 Allergies as of 3/1/2018  Review Complete On: 3/1/2018 By: Ellen Phillips LPN No Known Allergies Current Immunizations  Reviewed on 10/31/2017 No immunizations on file. Not reviewed this visit You Were Diagnosed With   
  
 Codes Comments Type 2 diabetes mellitus with hemoglobin A1c goal of less than 7.0% ([facility])    -  Primary ICD-10-CM: E11.9 ICD-9-CM: 250.00 Dyslipidemia     ICD-10-CM: E78.5 ICD-9-CM: 272.4 Vitals BP Pulse Temp Resp Height(growth percentile) Weight(growth percentile) 136/89 (BP 1 Location: Left arm, BP Patient Position: Sitting) 74 98.6 °F (37 °C) (Oral) 18 6' 1\" (1.854 m) 215 lb 6 oz (97.7 kg) SpO2 BMI Smoking Status 95% 28.42 kg/m2 Never Smoker Vitals History BMI and BSA Data Body Mass Index Body Surface Area  
 28.42 kg/m 2 2.24 m 2 Preferred Pharmacy Pharmacy Name Phone [facility] DR[facility] STORE [facility] [place] [address] [place] [address] AT [address] [place] [address] Drive [phone] Your Updated Medication List  
  
   
This list is accurate as of 3/1/18  5:02 PM.  Always use your most recent med list.  
  
  
  
  
 aspirin delayed-release 81 mg tablet Take 1 Tab by mouth daily. atorvastatin 10 mg tablet Commonly known as:  LIPITOR Take 1 Tab by mouth daily. glucose blood VI test strips strip Commonly known as:  [address] Test Fasting Blood Sugar Once Daily. Dx E11.9  
  
 insulin glargine 100 unit/mL (3 mL) Inpn Commonly known as:  LANTUS,BASAGLAR  
25 Units by SubCUTAneous route daily. Lancets Misc Commonly known as:  ACCU-CHEK FASTCLIX Test Fasting Blood Sugar Once Daily Jade Pen Needle 32 gauge x 5/32\" Ndle Generic drug:  Insulin Needles (Disposable) USE ONCE A DAY AS DIRECTED Prescriptions Printed Refills  
 atorvastatin (LIPITOR) 10 mg tablet 2 Sig: Take 1 Tab by mouth daily. Class: Print Route: Oral  
  
We Performed the Following AMB POC HEMOGLOBIN A1C [57524 CPT(R)] Follow-up Instructions Return in about 3 months (around 6/1/2018). Introducing [facility] & HEALTH SERVICES! Dear Edd Tyler: Thank you for requesting a [employer] account. Our records indicate that you already have an active [employer] account. You can access your account anytime at [email or web address] Did you know that you can access your hospital and ER discharge instructions at any time in Rockwell Collins? You can also review all of your test results from your hospital stay or ER visit. Additional Information If you have questions, please visit the Frequently Asked Questions section of the [employer]well Collins website at [email or web address] Remember, [employer] is NOT to be used for urgent needs. For medical emergencies, dial 911. Now available from your iPhone and Android! Please provide this summary of care documentation to your next provider. Your primary care clinician is listed as Michelle Soares. If you have any questions after today's visit, please call [phone].

## 2018-03-01 NOTE — PROGRESS NOTES
HISTORY OF PRESENT ILLNESS  Aileen Abarca is a 65 y.o. male. HPI  Diabetes:  He is here for follow up of diabetes. Proteinuria: no  Neuropathy: no  Medication change since last visit:  No   Diabetic Review of Systems - home glucose monitoring: is performed regularly, fasting values range 100-120. Hypoglycemic symptoms: no  Dilated eye exam in the last year: yes      Lab Results   Component Value Date/Time    Hemoglobin A1c 6.2 (H) 02/03/2018 08:36 AM    Hemoglobin A1c (POC) 6.1 03/01/2018 04:30 AM     Lab Results   Component Value Date/Time    Microalb/Creat ratio (ug/mg creat.) 14.9 02/03/2018 08:36 AM         Last Point of Care HGB A1C  Hemoglobin A1c (POC)   Date Value Ref Range Status   03/01/2018 6.1 % Final        .  Hyperlipidemia:  Aileen Abarca is following up on his dyslipidemia. Cardiovascular risks for him are: LDL goal is under 100  diabetic. Currently he takes  , nothing  Lab Results   Component Value Date/Time    Cholesterol, total 186 02/03/2018 08:36 AM    Cholesterol, total 188 09/12/2016 08:33 AM    Cholesterol, total 192 09/04/2015 11:12 AM    Cholesterol, total 193 12/20/2012 12:00 AM    HDL Cholesterol 41 02/03/2018 08:36 AM    HDL Cholesterol 43 09/12/2016 08:33 AM    HDL Cholesterol 45 09/04/2015 11:12 AM    HDL Cholesterol 46 12/20/2012 12:00 AM    LDL, calculated 118 (H) 02/03/2018 08:36 AM    LDL, calculated 126 (H) 09/12/2016 08:33 AM    LDL, calculated 121 (H) 09/04/2015 11:12 AM    LDL, calculated 111 (H) 12/20/2012 12:00 AM    Triglyceride 135 02/03/2018 08:36 AM    Triglyceride 96 09/12/2016 08:33 AM    Triglyceride 128 09/04/2015 11:12 AM    Triglyceride 179 (H) 12/20/2012 12:00 AM     Lab Results   Component Value Date/Time    ALT (SGPT) 21 12/20/2012 12:00 AM    AST (SGOT) 17 12/20/2012 12:00 AM    Alk.  phosphatase 82 12/20/2012 12:00 AM    Bilirubin, total 0.4 12/20/2012 12:00 AM     The pooled cohort 10 year cardiac risk calculation was made for Aileen Abarca and was 20 %             ROS    Physical Exam  Visit Vitals    /89 (BP 1 Location: Left arm, BP Patient Position: Sitting)    Pulse 74    Temp 98.6 °F (37 °C) (Oral)    Resp 18    Ht 6' 1\" (1.854 m)    Wt 215 lb 6 oz (97.7 kg)    SpO2 95%    BMI 28.42 kg/m2       ASSESSMENT and PLAN  Diagnoses and all orders for this visit:    1. Type 2 diabetes mellitus with hemoglobin A1c goal of less than 7.0% ([facility]) -Well controlled and stable. his medications were reviewed and refilled where necessary as noted below. Labs ordered as noted. -     AMB POC HEMOGLOBIN A1C    2. Dyslipidemia -we had long discussion about his elevated CVD event risk and need for statin and ASA. Start lipitor. Brooks Potter was counseled on this new medication prescribed. Adverse effects, risks and monitoring of medication along with potential benefits of medication were discussed. All of his questions about the medication were answered. Over 50% of the 25 minutes face to face with Brooks Potter consisted of counseling and/or discussing treatment plans in reference to his hyperlipidemia. The patient is advised to begin progressive daily aerobic exercise program, follow a low fat, low cholesterol diet and attempt to lose weight.              -     atorvastatin (LIPITOR) 10 mg tablet; Take 1 Tab by mouth daily. Follow-up Disposition:  Return in about 3 months (around 6/1/2018).

## 2018-05-30 DIAGNOSIS — E78.5 DYSLIPIDEMIA: ICD-10-CM

## 2018-05-31 RX ORDER — ATORVASTATIN CALCIUM 10 MG/1
TABLET, FILM COATED ORAL
Qty: 30 TAB | Refills: 0 | Status: SHIPPED | OUTPATIENT
Start: 2018-05-31 | End: 2018-06-01 | Stop reason: SDUPTHER

## 2018-06-01 ENCOUNTER — OFFICE VISIT (OUTPATIENT)
Dept: INTERNAL MEDICINE CLINIC | Age: 63
End: 2018-06-01

## 2018-06-01 VITALS
TEMPERATURE: 98.5 F | HEIGHT: 73 IN | OXYGEN SATURATION: 97 % | DIASTOLIC BLOOD PRESSURE: 88 MMHG | SYSTOLIC BLOOD PRESSURE: 136 MMHG | RESPIRATION RATE: 18 BRPM | BODY MASS INDEX: 28.43 KG/M2 | HEART RATE: 76 BPM | WEIGHT: 214.5 LBS

## 2018-06-01 DIAGNOSIS — E11.9 TYPE 2 DIABETES MELLITUS WITH HEMOGLOBIN A1C GOAL OF LESS THAN 7.0% (HCC): ICD-10-CM

## 2018-06-01 DIAGNOSIS — I10 MILD HYPERTENSION: ICD-10-CM

## 2018-06-01 DIAGNOSIS — E78.5 DYSLIPIDEMIA: Primary | ICD-10-CM

## 2018-06-01 LAB — HBA1C MFR BLD HPLC: 6.6 %

## 2018-06-01 RX ORDER — ATORVASTATIN CALCIUM 10 MG/1
TABLET, FILM COATED ORAL
Qty: 30 TAB | Refills: 5 | Status: SHIPPED | OUTPATIENT
Start: 2018-06-01 | End: 2018-12-27 | Stop reason: SDUPTHER

## 2018-06-01 NOTE — MR AVS SNAPSHOT
[address] [facility] [address] 
 
 
 [address] [place] [address] [facility] 
[phone] Patient: Eva Pritchard MRN: [id number] MARTINA:0[birth year] Visit Information Date & Time Provider Department Dept. Phone Encounter #  
 6/1/2018  4:15 PM Rollo Cranker, MD [facility] [address] [facility] [id number] Follow-up Instructions Return in about 6 months (around 12/1/2018). Upcoming Health Maintenance Date Due  
 FOOT EXAM Q1 6/11/1966 EYE EXAM RETINAL OR DILATED Q1 6/11/1966 COLONOSCOPY 6/11/1974 DTaP/Tdap/Td series (1 - Tdap) 6/11/1977 Influenza Age 5 to Adult 8/1/2018 HEMOGLOBIN A1C Q6M 9/1/2018 MICROALBUMIN Q1 2/3/2019 LIPID PANEL Q1 2/3/2019 Allergies as of 6/1/2018  Review Complete On: 3/1/2018 By: Isaiah Andersen LPN No Known Allergies Current Immunizations  Reviewed on 10/31/2017 No immunizations on file. Not reviewed this visit You Were Diagnosed With   
  
 Codes Comments Dyslipidemia    -  Primary ICD-10-CM: E78.5 ICD-9-CM: 272.4 Type 2 diabetes mellitus with hemoglobin A1c goal of less than 7.0% (HCC)     ICD-10-CM: E11.9 ICD-9-CM: 250.00 Vitals BP Pulse Temp Resp Height(growth percentile) Weight(growth percentile) 136/88 76 98.5 °F (36.9 °C) (Oral) 18 6' 1\" (1.854 m) 214 lb 8 oz (97.3 kg) SpO2 BMI Smoking Status 97% 28.3 kg/m2 Never Smoker Vitals History BMI and BSA Data Body Mass Index Body Surface Area  
 28.3 kg/m 2 2.24 m 2 Preferred Pharmacy Pharmacy Name Phone [facility], [address] [place] [address] AT [address] [place] [address] Drive [phone] Your Updated Medication List  
  
   
This list is accurate as of 6/1/18  4:22 PM.  Always use your most recent med list.  
  
  
  
  
 aspirin delayed-release 81 mg tablet Take 1 Tab by mouth daily. atorvastatin 10 mg tablet Commonly known as:  LIPITOR  
TAKE 1 TABLET BY MOUTH EVERY DAY  
  
 glucose blood VI test strips strip Commonly known as:  [address] Test Fasting Blood Sugar Once Daily. Dx E11.9  
  
 insulin glargine 100 unit/mL (3 mL) Inpn Commonly known as:  LANTUS,BASAGLAR  
25 Units by SubCUTAneous route daily. Lancets Misc Commonly known as:  ACCU-CHEK FASTCLIX Test Fasting Blood Sugar Once Daily Jade Pen Needle 32 gauge x 5/32\" Ndle Generic drug:  Insulin Needles (Disposable) USE EVERY DAY AS DIRECTED Prescriptions Sent to Pharmacy Refills  
 atorvastatin (LIPITOR) 10 mg tablet 5 Sig: TAKE 1 TABLET BY MOUTH EVERY DAY Class: Normal  
 Pharmacy: [facility] [address] AT [address] [place] [address] #: [phone] We Performed the Following AMB POC HEMOGLOBIN A1C [48244 CPT(R)] HEPATIC FUNCTION PANEL [71972 CPT(R)] LIPID PANEL [32571 CPT(R)] Follow-up Instructions Return in about 6 months (around 12/1/2018). Introducing [facility] & HEALTH SERVICES! Dear Sebastian Wade: Thank you for requesting a TopPatch account. Our records indicate that you already have an active TopPatch account. You can access your account anytime at [email or web address] Did you know that you can access your hospital and ER discharge instructions at any time in TopPatch? You can also review all of your test results from your hospital stay or ER visit. Additional Information If you have questions, please visit the Frequently Asked Questions section of the TopPatch website at [email or web address] Remember, TopPatch is NOT to be used for urgent needs. For medical emergencies, dial 911. Now available from your iPhone and Android! Please provide this summary of care documentation to your next provider. Your primary care clinician is listed as Stella Lo. If you have any questions after today's visit, please call [phone].

## 2018-06-01 NOTE — PROGRESS NOTES
HISTORY OF PRESENT ILLNESS  Neel Garrett is a 65 y.o. male. HPI  Diabetes:  He is here for follow up of diabetes. Proteinuria: no  Neuropathy: no  Medication change since last visit:  No   Diabetic Review of Systems - medication compliance: compliant all of the time, diabetic diet compliance: compliant most of the time. Hypoglycemic symptoms: no      Lab Results   Component Value Date/Time    Hemoglobin A1c 6.2 (H) 02/03/2018 08:36 AM    Hemoglobin A1c (POC) 6.6 06/01/2018 04:00 AM     Lab Results   Component Value Date/Time    Microalb/Creat ratio (ug/mg creat.) 14.9 02/03/2018 08:36 AM         Last Point of Care HGB A1C  Hemoglobin A1c (POC)   Date Value Ref Range Status   06/01/2018 6.6 % Final      Hyperlipidemia:  Neel Garrett is following up on his dyslipidemia. Cardiovascular risks for him are: LDL goal is under 100  diabetic. Currently he takes Lipitor (atorvastatin) , started 3 months ago  Lab Results   Component Value Date/Time    Cholesterol, total 186 02/03/2018 08:36 AM    Cholesterol, total 188 09/12/2016 08:33 AM    Cholesterol, total 192 09/04/2015 11:12 AM    Cholesterol, total 193 12/20/2012 12:00 AM    HDL Cholesterol 41 02/03/2018 08:36 AM    HDL Cholesterol 43 09/12/2016 08:33 AM    HDL Cholesterol 45 09/04/2015 11:12 AM    HDL Cholesterol 46 12/20/2012 12:00 AM    LDL, calculated 118 (H) 02/03/2018 08:36 AM    LDL, calculated 126 (H) 09/12/2016 08:33 AM    LDL, calculated 121 (H) 09/04/2015 11:12 AM    LDL, calculated 111 (H) 12/20/2012 12:00 AM    Triglyceride 135 02/03/2018 08:36 AM    Triglyceride 96 09/12/2016 08:33 AM    Triglyceride 128 09/04/2015 11:12 AM    Triglyceride 179 (H) 12/20/2012 12:00 AM     Lab Results   Component Value Date/Time    ALT (SGPT) 21 12/20/2012 12:00 AM    AST (SGOT) 17 12/20/2012 12:00 AM    Alk.  phosphatase 82 12/20/2012 12:00 AM    Bilirubin, total 0.4 12/20/2012 12:00 AM       Myalgias: no  Fatigue: no              ROS    Physical Exam  Visit Vitals    /88    Pulse 76    Temp 98.5 °F (36.9 °C) (Oral)    Resp 18    Ht 6' 1\" (1.854 m)    Wt 214 lb 8 oz (97.3 kg)    SpO2 97%    BMI 28.3 kg/m2       ASSESSMENT and PLAN  Diagnoses and all orders for this visit:    1. Dyslipidemia - recheck on statin  -     HEPATIC FUNCTION PANEL  -     LIPID PANEL  -     atorvastatin (LIPITOR) 10 mg tablet; TAKE 1 TABLET BY MOUTH EVERY DAY    2. Type 2 diabetes mellitus with hemoglobin A1c goal of less than 7.0% ([facility]) - Well controlled and stable. his medications were reviewed and refilled where necessary as noted below. Labs ordered as noted. -     AMB POC HEMOGLOBIN A1C    3. Mild hypertension -he plans to lose 10 lbs over 6 months. Recheck next visit. Log blood pressures at home while sitting, relaxed 3-5 times weekly and bring to next visit. Pt educated on goal BP of 130/80 on average or lower. Call office as soon as possible if BP's over 140/90 or below 110/50 on multiple occasions and/or with symptoms of dizziness, chest pain, shortness of breath, headache or ankle swelling. Recheck log and bp here in 6 month(s). Follow-up Disposition:  Return in about 6 months (around 12/1/2018).

## 2018-06-17 LAB
ALBUMIN SERPL-MCNC: 4.2 G/DL (ref 3.6–4.8)
ALP SERPL-CCNC: 81 IU/L (ref 39–117)
ALT SERPL-CCNC: 22 IU/L (ref 0–44)
AST SERPL-CCNC: 15 IU/L (ref 0–40)
BILIRUB DIRECT SERPL-MCNC: 0.24 MG/DL (ref 0–0.4)
BILIRUB SERPL-MCNC: 0.8 MG/DL (ref 0–1.2)
CHOLEST SERPL-MCNC: 124 MG/DL (ref 100–199)
HDLC SERPL-MCNC: 40 MG/DL
INTERPRETATION, 910389: NORMAL
LDLC SERPL CALC-MCNC: 55 MG/DL (ref 0–99)
PROT SERPL-MCNC: 6.6 G/DL (ref 6–8.5)
TRIGL SERPL-MCNC: 145 MG/DL (ref 0–149)
VLDLC SERPL CALC-MCNC: 29 MG/DL (ref 5–40)

## 2018-09-05 ENCOUNTER — OFFICE VISIT (OUTPATIENT)
Dept: URGENT CARE | Age: 63
End: 2018-09-05

## 2018-09-05 VITALS
TEMPERATURE: 97.7 F | HEIGHT: 72 IN | DIASTOLIC BLOOD PRESSURE: 102 MMHG | BODY MASS INDEX: 29.12 KG/M2 | SYSTOLIC BLOOD PRESSURE: 148 MMHG | OXYGEN SATURATION: 98 % | WEIGHT: 215 LBS | RESPIRATION RATE: 18 BRPM | HEART RATE: 92 BPM

## 2018-09-05 DIAGNOSIS — H11.31 SUBCONJUNCTIVAL HEMORRHAGE OF RIGHT EYE: Primary | ICD-10-CM

## 2018-09-05 RX ORDER — CIPROFLOXACIN HYDROCHLORIDE 3.5 MG/ML
1 SOLUTION/ DROPS TOPICAL
Qty: 100 ML | Refills: 0 | Status: SHIPPED | OUTPATIENT
Start: 2018-09-05 | End: 2018-09-15

## 2018-09-05 NOTE — LETTER
NOTIFICATION OF RETURN TO WORK / SCHOOL 
 
9/5/2018 8:47 PM 
 
Mr. Nahun Espino [address] [place] [id number] Dmitry Solis To Whom It May Concern: 
 
Nahun Espino was under the care of [address] from 9/7/18 to 9/9/18. He will be able to return to work/school on 9/9/18 with no restrictions. If there are questions or concerns please have the patient contact our office. Sincerely, Tip Wadsworth, [address], PA-C 
[facility] PROVIDER

## 2018-09-05 NOTE — MR AVS SNAPSHOT
[address] [facility] [address] 
[phone] Patient: Hilda López MRN: [id number] LJN:8[birth year]49[birth year]241 Visit Information Date & Time Provider Department Dept. Phone Encounter #  
 9/5/2018  7:15 PM [address] [phone] [id number] Follow-up Instructions Return in about 2 days (around 9/7/2018), or if symptoms worsen or fail to improve. Routing History Your Appointments 12/4/2018  4:15 PM  
ROUTINE CARE with Cinthia Pond MD  
[facility] [address]) Appt Note: 6 mo  
 [address] [address]  
[phone]  
  
   
 [address] [facility] [address] Upcoming Health Maintenance Date Due  
 FOOT EXAM Q1 6/11/1966 EYE EXAM RETINAL OR DILATED Q1 6/11/1966 COLONOSCOPY 6/11/1974 DTaP/Tdap/Td series (1 - Tdap) 6/11/1977 Influenza Age 5 to Adult 8/1/2018 HEMOGLOBIN A1C Q6M 12/1/2018 MICROALBUMIN Q1 2/3/2019 LIPID PANEL Q1 6/16/2019 Allergies as of 9/5/2018  Review Complete On: 9/5/2018 By: Terry Ahuja LPN No Known Allergies Current Immunizations  Reviewed on 10/31/2017 No immunizations on file. Not reviewed this visit You Were Diagnosed With   
  
 Codes Comments Subconjunctival hemorrhage of right eye    -  Primary ICD-10-CM: H11.31 
ICD-9-CM: 372.72 Vitals BP Pulse Temp Resp Height(growth percentile) Weight(growth percentile) (!) 148/102 92 97.7 °F (36.5 °C) 18 6' (1.829 m) 215 lb (97.5 kg) SpO2 BMI Smoking Status 98% 29.16 kg/m2 Never Smoker BMI and BSA Data Body Mass Index Body Surface Area  
 29.16 kg/m 2 2.23 m 2 Preferred Pharmacy Pharmacy Name Phone [facility] DRUG STORE [facility], [address] [phone] Your Updated Medication List  
 This list is accurate as of 9/5/18  8:52 PM.  Always use your most recent med list.  
  
  
  
  
 aspirin delayed-release 81 mg tablet Take 1 Tab by mouth daily. atorvastatin 10 mg tablet Commonly known as:  LIPITOR  
TAKE 1 TABLET BY MOUTH EVERY DAY  
  
 ciprofloxacin HCl 0.3 % ophthalmic solution Commonly known as:  Skipper Nab Administer 1 Drop to right eye every two (2) hours for 10 days. Indications: BACTERIAL CONJUNCTIVITIS  
  
 glucose blood VI test strips strip Commonly known as:  [address] Test Fasting Blood Sugar Once Daily. Dx E11.9  
  
 insulin glargine 100 unit/mL (3 mL) Inpn Commonly known as:  LANTUS,BASAGLAR  
25 Units by SubCUTAneous route daily. Lancets Misc Commonly known as:  ACCU-CHEK FASTCLIX LANCING DEV Test Fasting Blood Sugar Once Daily Jade Pen Needle 32 gauge x 5/32\" Ndle Generic drug:  Insulin Needles (Disposable) USE EVERY DAY AS DIRECTED Prescriptions Printed Refills  
 ciprofloxacin HCl (CILOXAN) 0.3 % ophthalmic solution 0 Sig: Administer 1 Drop to right eye every two (2) hours for 10 days. Indications: BACTERIAL CONJUNCTIVITIS Class: Print Route: Right Eye Follow-up Instructions Return in about 2 days (around 9/7/2018), or if symptoms worsen or fail to improve. Patient Instructions Subconjunctival Hemorrhage: Care Instructions Your Care Instructions Sometimes small blood vessels in the white of the eye can break, causing a red spot or speck. This is called a subconjunctival hemorrhage. The blood vessels may break when you sneeze, cough, vomit, strain, or bend over. Sometimes there is no clear cause. The blood may look alarming, especially if the spot is large. If there is no pain or vision change, there is usually no reason to worry, and the blood slowly will go away on its own in 2 to 3 weeks. Follow-up care is a key part of your treatment and safety. Be sure to make and go to all appointments, and call your doctor if you are having problems. It's also a good idea to know your test results and keep a list of the medicines you take. How can you care for yourself at home? · Watch for changes in your eye. It is normal for the red spot on your eyeball to change color as it heals. Just like a bruise on your skin, it may change from red to brown to purple to yellow. · Do not take aspirin or products that contain aspirin, which can increase bleeding. Use acetaminophen (Tylenol) if you need pain relief for another problem. · Do not take two or more pain medicines at the same time unless the doctor told you to. Many pain medicines have acetaminophen, which is Tylenol. Too much acetaminophen (Tylenol) can be harmful. When should you call for help? Call your doctor now or seek immediate medical care if: 
  · You have signs of an eye infection, such as: 
¨ Pus or thick discharge coming from the eye. ¨ Redness or swelling around the eye. ¨ A fever.  
  · You see blood over the black part of your eye (pupil).  
  · You have any changes or problems in your vision.  
  · You have any pain in your eye.  
 Watch closely for changes in your health, and be sure to contact your doctor if: 
  · You do not get better as expected. Where can you learn more? Go to http://elizabeth-luke.info/. Enter K880 in the search box to learn more about \"Subconjunctival Hemorrhage: Care Instructions. \" Current as of: December 3, 2017 Content Version: 11.7 © 1269-4604 [employer]. Care instructions adapted under license by [employer] (which disclaims liability or warranty for this information).  If you have questions about a medical condition or this instruction, always ask your healthcare professional. [address] any warranty or liability for your use of this information. Introducing [facility] & HEALTH SERVICES! Dear Joanne Elizabeth: Thank you for requesting a anydooR account. Our records indicate that you already have an active anydooR account. You can access your account anytime at [email or web address] Did you know that you can access your hospital and ER discharge instructions at any time in anydooR? You can also review all of your test results from your hospital stay or ER visit. Additional Information If you have questions, please visit the Frequently Asked Questions section of the anydooR website at [email or web address] Remember, anydooR is NOT to be used for urgent needs. For medical emergencies, dial 911. Now available from your iPhone and Android! Please provide this summary of care documentation to your next provider. Your primary care clinician is listed as Eileen Schneider. If you have any questions after today's visit, please call [phone].

## 2018-09-06 NOTE — PATIENT INSTRUCTIONS
Subconjunctival Hemorrhage: Care Instructions  Your Care Instructions    Sometimes small blood vessels in the white of the eye can break, causing a red spot or speck. This is called a subconjunctival hemorrhage. The blood vessels may break when you sneeze, cough, vomit, strain, or bend over. Sometimes there is no clear cause. The blood may look alarming, especially if the spot is large. If there is no pain or vision change, there is usually no reason to worry, and the blood slowly will go away on its own in 2 to 3 weeks. Follow-up care is a key part of your treatment and safety. Be sure to make and go to all appointments, and call your doctor if you are having problems. It's also a good idea to know your test results and keep a list of the medicines you take. How can you care for yourself at home? · Watch for changes in your eye. It is normal for the red spot on your eyeball to change color as it heals. Just like a bruise on your skin, it may change from red to brown to purple to yellow. · Do not take aspirin or products that contain aspirin, which can increase bleeding. Use acetaminophen (Tylenol) if you need pain relief for another problem. · Do not take two or more pain medicines at the same time unless the doctor told you to. Many pain medicines have acetaminophen, which is Tylenol. Too much acetaminophen (Tylenol) can be harmful. When should you call for help? Call your doctor now or seek immediate medical care if:    · You have signs of an eye infection, such as:  ¨ Pus or thick discharge coming from the eye. ¨ Redness or swelling around the eye. ¨ A fever.     · You see blood over the black part of your eye (pupil).     · You have any changes or problems in your vision.     · You have any pain in your eye.    Watch closely for changes in your health, and be sure to contact your doctor if:    · You do not get better as expected. Where can you learn more?   Go to http://elizabeth-luke.info/. Enter H195 in the search box to learn more about \"Subconjunctival Hemorrhage: Care Instructions. \"  Current as of: December 3, 2017  Content Version: 11.7  © 2385-3250 [employer]. Care instructions adapted under license by [employer] (which disclaims liability or warranty for this information). If you have questions about a medical condition or this instruction, always ask your healthcare professional. [address] any warranty or liability for your use of this information.

## 2018-09-06 NOTE — PROGRESS NOTES
Patient is a 59 y.o. male presenting with eye irritation. The history is provided by the patient and the spouse (Pt. states he was helping a neighbor pull down a tree, and pt. states no trauma occurred but  eye became red with blood under layer of eyeball. Pt. states I take aspirin, and I'm a diabetic). Red Eye   This is a new problem. The current episode started 1 to 2 hours ago. The problem has not changed since onset. Pertinent negatives include no chest pain, no abdominal pain, no headaches and no shortness of breath. He has tried nothing for the symptoms. Past Medical History:   Diagnosis Date    Diabetes ([facility] [address]     Hypertension         Past Surgical History:   Procedure Laterality Date    HX HERNIA REPAIR      R inguinal         Family History   Problem Relation Age of Onset    Diabetes Father     Hypertension Father     Cancer Mother      breast/lung    Hypertension Sister     Hypertension Brother     Elevated Lipids Neg Hx         Social History     Social History    Marital status:      Spouse name: N/A    Number of children: 3    Years of education: N/A     Occupational History    Not on file. Social History Main Topics    Smoking status: Never Smoker    Smokeless tobacco: Current User     Types: Snuff      Comment: dip/everyday     Alcohol use 0.0 oz/week     0 Standard drinks or equivalent per week      Comment: rarely     Drug use: No    Sexual activity: Yes     Partners: Female     Other Topics Concern    Not on file     Social History Narrative                ALLERGIES: Review of patient's allergies indicates no known allergies. Review of Systems   HENT: Negative for congestion, ear discharge and ear pain. Eyes: Positive for discharge and redness. Negative for photophobia, pain, itching and visual disturbance. Respiratory: Negative for shortness of breath. Cardiovascular: Negative for chest pain and leg swelling.    Gastrointestinal: Negative for abdominal pain. Neurological: Negative for headaches. Hematological: Negative for adenopathy. Does not bruise/bleed easily. All other systems reviewed and are negative. Vitals:    09/05/18 1942   BP: (!) 148/102   Pulse: 92   Resp: 18   Temp: 97.7 °F (36.5 °C)   SpO2: 98%   Weight: 215 lb (97.5 kg)   Height: 6' (1.829 m)       Physical Exam   Constitutional: He is oriented to person, place, and time. He appears well-developed and well-nourished. No distress. HENT:   Head: Normocephalic and atraumatic. Eyes: Conjunctivae and EOM are normal. Pupils are equal, round, and reactive to light. Right eye exhibits no discharge. Left eye exhibits no discharge. No scleral icterus. Right Eye: TTP at RLQ of orbit, there is a hematoma involving right sclera laterally and medially, not invading globe or aqueous or vitreous humor, fundoscopic exam without hyphema or blood fluid level, cup to disc ratio is normal, Left eye- injected conjunctiva/sclera. Neck: Normal range of motion. Neck supple. No JVD present. No tracheal deviation present. No thyromegaly present. Cardiovascular: Normal rate, regular rhythm and normal heart sounds. Exam reveals no gallop and no friction rub. No murmur heard. Pulmonary/Chest: Effort normal and breath sounds normal. No stridor. No respiratory distress. He has no wheezes. He has no rales. He exhibits no tenderness. Abdominal: Soft. Bowel sounds are normal. He exhibits no distension and no mass. There is no tenderness. There is no rebound and no guarding. Musculoskeletal: Normal range of motion. He exhibits no edema or tenderness. Lymphadenopathy:     He has no cervical adenopathy. Neurological: He is alert and oriented to person, place, and time. He displays normal reflexes. No cranial nerve deficit. Coordination normal.   Skin: Skin is warm and dry. No rash noted. He is not diaphoretic. No erythema. No pallor. Psychiatric: He has a normal mood and affect.  His behavior is normal. Judgment and thought content normal.   Nursing note and vitals reviewed. MDM     Differential Diagnosis; Clinical Impression; Plan:     Subconjunctival Hemorrhage- Cipro OPTIC in OD, F/u in 1-2 days with PCP to elucidate etiology further of bleeding disorder, and if referral to Opthamologist needed.   Risk of Significant Complications, Morbidity, and/or Mortality:   Presenting problems:  Low  Diagnostic procedures:  Minimal  Management options:  Low  Progress:   Patient progress:  Stable      Procedures

## 2018-10-26 RX ORDER — INSULIN GLARGINE 100 [IU]/ML
25 INJECTION, SOLUTION SUBCUTANEOUS DAILY
Qty: 15 ML | Refills: 1 | Status: SHIPPED | OUTPATIENT
Start: 2018-10-26 | End: 2019-02-24 | Stop reason: SDUPTHER

## 2018-12-04 ENCOUNTER — OFFICE VISIT (OUTPATIENT)
Dept: INTERNAL MEDICINE CLINIC | Age: 63
End: 2018-12-04

## 2018-12-04 VITALS
WEIGHT: 218.38 LBS | DIASTOLIC BLOOD PRESSURE: 91 MMHG | SYSTOLIC BLOOD PRESSURE: 151 MMHG | RESPIRATION RATE: 18 BRPM | TEMPERATURE: 98.9 F | BODY MASS INDEX: 29.58 KG/M2 | HEART RATE: 80 BPM | OXYGEN SATURATION: 97 % | HEIGHT: 72 IN

## 2018-12-04 DIAGNOSIS — Z12.5 PROSTATE CANCER SCREENING: ICD-10-CM

## 2018-12-04 DIAGNOSIS — I10 MILD HYPERTENSION: ICD-10-CM

## 2018-12-04 DIAGNOSIS — E78.5 DYSLIPIDEMIA: ICD-10-CM

## 2018-12-04 DIAGNOSIS — E11.9 TYPE 2 DIABETES MELLITUS WITH HEMOGLOBIN A1C GOAL OF LESS THAN 7.0% (HCC): Primary | ICD-10-CM

## 2018-12-04 LAB — HBA1C MFR BLD HPLC: 8 %

## 2018-12-04 NOTE — PATIENT INSTRUCTIONS
Body Mass Index: Care Instructions  Your Care Instructions    Body mass index (BMI) can help you see if your weight is raising your risk for health problems. It uses a formula to compare how much you weigh with how tall you are. · A BMI lower than 18.5 is considered underweight. · A BMI between 18.5 and 24.9 is considered healthy. · A BMI between 25 and 29.9 is considered overweight. A BMI of 30 or higher is considered obese. If your BMI is in the normal range, it means that you have a lower risk for weight-related health problems. If your BMI is in the overweight or obese range, you may be at increased risk for weight-related health problems, such as high blood pressure, heart disease, stroke, arthritis or joint pain, and diabetes. If your BMI is in the underweight range, you may be at increased risk for health problems such as fatigue, lower protection (immunity) against illness, muscle loss, bone loss, hair loss, and hormone problems. BMI is just one measure of your risk for weight-related health problems. You may be at higher risk for health problems if you are not active, you eat an unhealthy diet, or you drink too much alcohol or use tobacco products. Follow-up care is a key part of your treatment and safety. Be sure to make and go to all appointments, and call your doctor if you are having problems. It's also a good idea to know your test results and keep a list of the medicines you take. How can you care for yourself at home? · Practice healthy eating habits. This includes eating plenty of fruits, vegetables, whole grains, lean protein, and low-fat dairy. · If your doctor recommends it, get more exercise. Walking is a good choice. Bit by bit, increase the amount you walk every day. Try for at least 30 minutes on most days of the week. · Do not smoke. Smoking can increase your risk for health problems. If you need help quitting, talk to your doctor about stop-smoking programs and medicines. These can increase your chances of quitting for good. · Limit alcohol to 2 drinks a day for men and 1 drink a day for women. Too much alcohol can cause health problems. If you have a BMI higher than 25  · Your doctor may do other tests to check your risk for weight-related health problems. This may include measuring the distance around your waist. A waist measurement of more than 40 inches in men or 35 inches in women can increase the risk of weight-related health problems. · Talk with your doctor about steps you can take to stay healthy or improve your health. You may need to make lifestyle changes to lose weight and stay healthy, such as changing your diet and getting regular exercise. If you have a BMI lower than 18.5  · Your doctor may do other tests to check your risk for health problems. · Talk with your doctor about steps you can take to stay healthy or improve your health. You may need to make lifestyle changes to gain or maintain weight and stay healthy, such as getting more healthy foods in your diet and doing exercises to build muscle. Where can you learn more? Go to http://elizabeth-luke.info/. Enter S176 in the search box to learn more about \"Body Mass Index: Care Instructions. \"  Current as of: October 13, 2016  Content Version: 11.4  © 7274-5065 Healthwise, Incorporated. Care instructions adapted under license by [employer] (which disclaims liability or warranty for this information). If you have questions about a medical condition or this instruction, always ask your healthcare professional. [address] any warranty or liability for your use of this information.

## 2018-12-04 NOTE — PROGRESS NOTES
HISTORY OF PRESENT ILLNESS  Den Chao is a 59 y.o. male. HPI  Hypertension:  Den Chao is a 59 y.o. male with hypertension. with Chronic kidney disease stage 2   Medication change since last visit: NA  The patient reports:  patient does not perform home BP monitoring, no chest pain on exertion, no dyspnea on exertion, no swelling of ankles, no orthostatic dizziness or lightheadedness, no palpitations. Lifestyle modification/social history: generally follows a low fat low cholesterol diet, nonsmoker    Lab Results   Component Value Date/Time    Sodium 144 02/03/2018 08:36 AM    Potassium 4.6 02/03/2018 08:36 AM    Chloride 105 02/03/2018 08:36 AM    CO2 24 02/03/2018 08:36 AM    Glucose 111 (H) 02/03/2018 08:36 AM    BUN 18 02/03/2018 08:36 AM    Creatinine 1.23 02/03/2018 08:36 AM    BUN/Creatinine ratio 15 02/03/2018 08:36 AM    GFR est AA 73 02/03/2018 08:36 AM    GFR est non-AA 63 02/03/2018 08:36 AM    Calcium 9.3 02/03/2018 08:36 AM         Diabetes:  He is here for follow up of diabetes. Proteinuria: no  Neuropathy: no  Medication change since last visit:  No   Diabetic Review of Systems - medication compliance: compliant all of the time, diabetic diet compliance: compliant all of the time, home glucose monitoring: is not performed. Hypoglycemic symptoms: no  Dilated eye exam in the last year: yes      Lab Results   Component Value Date/Time    Hemoglobin A1c 6.2 (H) 02/03/2018 08:36 AM    Hemoglobin A1c (POC) 8.0 12/04/2018 04:40 AM     Lab Results   Component Value Date/Time    Microalb/Creat ratio (ug/mg creat.) 14.9 02/03/2018 08:36 AM         Last Point of Care HGB A1C  Hemoglobin A1c (POC)   Date Value Ref Range Status   12/04/2018 8.0 % Final        Hyperlipidemia:  Den Chao is following up on his dyslipidemia. Cardiovascular risks for him are: LDL goal is under 100  diabetic  hypertension.    Currently he takes Lipitor (atorvastatin) ,   Lab Results   Component Value Date/Time    Cholesterol, total 124 06/16/2018 08:45 AM    Cholesterol, total 186 02/03/2018 08:36 AM    Cholesterol, total 188 09/12/2016 08:33 AM    Cholesterol, total 192 09/04/2015 11:12 AM    Cholesterol, total 193 12/20/2012 12:00 AM    HDL Cholesterol 40 06/16/2018 08:45 AM    HDL Cholesterol 41 02/03/2018 08:36 AM    HDL Cholesterol 43 09/12/2016 08:33 AM    HDL Cholesterol 45 09/04/2015 11:12 AM    HDL Cholesterol 46 12/20/2012 12:00 AM    LDL, calculated 55 06/16/2018 08:45 AM    LDL, calculated 118 (H) 02/03/2018 08:36 AM    LDL, calculated 126 (H) 09/12/2016 08:33 AM    LDL, calculated 121 (H) 09/04/2015 11:12 AM    LDL, calculated 111 (H) 12/20/2012 12:00 AM    Triglyceride 145 06/16/2018 08:45 AM    Triglyceride 135 02/03/2018 08:36 AM    Triglyceride 96 09/12/2016 08:33 AM    Triglyceride 128 09/04/2015 11:12 AM    Triglyceride 179 (H) 12/20/2012 12:00 AM     Lab Results   Component Value Date/Time    ALT (SGPT) 22 06/16/2018 08:45 AM    AST (SGOT) 15 06/16/2018 08:45 AM    Alk. phosphatase 81 06/16/2018 08:45 AM    Bilirubin, direct 0.24 06/16/2018 08:45 AM    Bilirubin, total 0.8 06/16/2018 08:45 AM       Myalgias: no  Fatigue: no            ROS    Physical Exam   Constitutional: He appears well-developed and well-nourished. No distress. BP (!) 151/91   Pulse 80   Temp 98.9 °F (37.2 °C)   Resp 18   Ht 6' (1.829 m)   Wt 218 lb 6 oz (99.1 kg)   SpO2 97%   BMI 29.62 kg/m² Body mass index is 29.62 kg/m². HENT:   Mouth/Throat: Oropharynx is clear and moist.   Neck: No JVD present. Carotid bruit is not present. Cardiovascular: Normal rate, regular rhythm, normal heart sounds and intact distal pulses. No murmur heard. Pulmonary/Chest: Effort normal and breath sounds normal.   Musculoskeletal: He exhibits no edema. Neurological: He is alert. Skin: Skin is warm and dry. He is not diaphoretic. Nursing note and vitals reviewed.       ASSESSMENT and PLAN  Diagnoses and all orders for this visit: 1. Type 2 diabetes mellitus with hemoglobin A1c goal of less than 7.0% ([facility]) - add jardiance to insulin. Reina Joy was counseled on this new medication prescribed. Adverse effects, risks and monitoring of medication along with potential benefits of medication were discussed. All of his questions about the medication were answered. Recheck A1c in 3 months. Work on diet and exercise. -     AMB POC HEMOGLOBIN A1C  -     empagliflozin (JARDIANCE) 25 mg tablet; Take 1 Tab by mouth daily. 2. Prostate cancer screening  -     PSA, DIAGNOSTIC (PROSTATE SPECIFIC AG)    3. Dyslipidemia -recheck  -     LIPID PANEL    4. Mild hypertension - increasing with wt. He is very hesitant to start more meds. Work on wt loss. Recheck in 3 months. -     METABOLIC PANEL, BASIC      Follow-up Disposition:  Return in about 3 months (around 3/4/2019). Discussed the patient's BMI with him. The BMI follow up plan is as follows:     dietary management education, guidance, and counseling  encourage exercise  monitor weight  prescribed dietary intake    An After Visit Summary was printed and given to the patient.

## 2018-12-07 ENCOUNTER — TELEPHONE (OUTPATIENT)
Dept: INTERNAL MEDICINE CLINIC | Age: 63
End: 2018-12-07

## 2018-12-07 NOTE — TELEPHONE ENCOUNTER
Prior authorization started via www.[employer]. [place] for Jardiance and approved. Metformin and products with metformin was therapeutic failure.  Pharmacy informed of approval.

## 2018-12-27 DIAGNOSIS — E78.5 DYSLIPIDEMIA: ICD-10-CM

## 2018-12-27 RX ORDER — ATORVASTATIN CALCIUM 10 MG/1
TABLET, FILM COATED ORAL
Qty: 30 TAB | Refills: 0 | Status: SHIPPED | OUTPATIENT
Start: 2018-12-27 | End: 2019-01-23 | Stop reason: SDUPTHER

## 2019-01-08 ENCOUNTER — OFFICE VISIT (OUTPATIENT)
Dept: INTERNAL MEDICINE CLINIC | Age: 64
End: 2019-01-08

## 2019-01-08 VITALS
SYSTOLIC BLOOD PRESSURE: 132 MMHG | TEMPERATURE: 97.8 F | OXYGEN SATURATION: 97 % | HEIGHT: 73 IN | HEART RATE: 60 BPM | DIASTOLIC BLOOD PRESSURE: 80 MMHG | WEIGHT: 212.6 LBS | RESPIRATION RATE: 14 BRPM | BODY MASS INDEX: 28.18 KG/M2

## 2019-01-08 DIAGNOSIS — E78.5 DYSLIPIDEMIA: ICD-10-CM

## 2019-01-08 DIAGNOSIS — Z12.11 COLON CANCER SCREENING: ICD-10-CM

## 2019-01-08 DIAGNOSIS — E11.9 TYPE 2 DIABETES MELLITUS WITH HEMOGLOBIN A1C GOAL OF LESS THAN 7.0% (HCC): ICD-10-CM

## 2019-01-08 DIAGNOSIS — Z23 ENCOUNTER FOR IMMUNIZATION: ICD-10-CM

## 2019-01-08 DIAGNOSIS — Z00.00 WELL ADULT HEALTH CHECK: Primary | ICD-10-CM

## 2019-01-08 NOTE — PATIENT INSTRUCTIONS
Recombinant Zoster (Shingles) Vaccine, RZV: What you need to know  Why get vaccinated? Shingles (also called herpes zoster, or just zoster) is a painful skin rash, often with blisters. Shingles is caused by the varicella zoster virus, the same virus that causes chickenpox. After you have chickenpox, the virus stays in your body and can cause shingles later in life. You can't catch shingles from another person. However, a person who has never had chickenpox (or chickenpox vaccine) could get chickenpox from someone with shingles. A shingles rash usually appears on one side of the face or body and heals within 2 to 4 weeks. Its main symptom is pain, which can be severe. Other symptoms can include fever, headache, chills, and upset stomach. Very rarely, a shingles infection can lead to pneumonia, hearing problems, blindness, brain inflammation (encephalitis), or death. For about 1 person in 5, severe pain can continue even long after the rash has cleared up. This long-lasting pain is called post-herpetic neuralgia (PHN). Shingles is far more common in people 48years of age and older than in younger people, and the risk increases with age. It is also more common in people whose immune system is weakened because of a disease such as cancer, or by drugs such as steroids or chemotherapy. At least 1 million people a year in the [employer] get shingles. Shingles vaccine (recombinant)  Recombinant shingles vaccine was approved by FDA in 2017 for the prevention of shingles. In clinical trials, it was more than 90% effective in preventing shingles. It can also reduce the likelihood of PHN. Two doses, 2 to 6 months apart, are recommended for adults 48 and older. This vaccine is also recommended for people who have already gotten the live shingles vaccine (Zostavax). There is no live virus in this vaccine.   Some people should not get this vaccine  Tell your vaccine provider if you:  · Have any severe, life-threatening allergies. A person who has ever had a life-threatening allergic reaction after a dose of recombinant shingles vaccine, or has a severe allergy to any component of this vaccine, may be advised not to be vaccinated. Ask your health care provider if you want information about vaccine components. · Are pregnant or breastfeeding. There is not much information about use of recombinant shingles vaccine in pregnant or nursing women. Your healthcare provider might recommend delaying vaccination. · Are not feeling well. If you have a mild illness, such as a cold, you can probably get the vaccine today. If you are moderately or severely ill, you should probably wait until you recover. Your doctor can advise you. Risks of a vaccine reaction  With any medicine, including vaccines, there is a chance of reactions. After recombinant shingles vaccination, a person might experience:  · Pain, redness, soreness, or swelling at the site of the injection  · Headache, muscle aches, fever, shivering, fatigue  In clinical trials, most people got a sore arm with mild or moderate pain after vaccination, and some also had redness and swelling where they got the shot. Some people felt tired, had muscle pain, a headache, shivering, fever, stomach pain, or nausea. About 1 out of 6 people who got recombinant zoster vaccine experienced side effects that prevented them from doing regular activities. Symptoms went away on their own in about 2 to 3 days. Side effects were more common in younger people. You should still get the second dose of recombinant zoster vaccine even if you had one of these reactions after the first dose. Other things that could happen after this vaccine:  · People sometimes faint after medical procedures, including vaccination. Sitting or lying down for about 15 minutes can help prevent fainting and injuries caused by a fall.  Tell your provider if you feel dizzy or have vision changes or ringing in the ears.  · Some people get shoulder pain that can be more severe and longer-lasting than routine soreness that can follow injections. This happens very rarely. · Any medication can cause a severe allergic reaction. Such reactions to a vaccine are estimated at about 1 in a million doses, and would happen within a few minutes to a few hours after the vaccination. As with any medicine, there is a very remote chance of a vaccine causing a serious injury or death. The safety of vaccines is always being monitored. For more information, visit: www.cdc.gov/vaccinesafety/  What if there is a serious problem? What should I look for? · Look for anything that concerns you, such as signs of a severe allergic reaction, very high fever, or unusual behavior. Signs of a severe allergic reaction can include hives, swelling of the face and throat, difficulty breathing, a fast heartbeat, dizziness, and weakness. These would usually start a few minutes to a few hours after the vaccination. What should I do? · If you think it is a severe allergic reaction or other emergency that can't wait, call 9-1-1 or get to the nearest hospital. Otherwise, call your health care provider. · Afterward, the reaction should be reported to the Vaccine Adverse Event Reporting System (VAERS). Your doctor should file this report, or you can do it yourself through the VAERS website at www.vaers. hhs.gov, or by calling 8-496.824.2053. VAERS does not give medical advice. .  How can I learn more? · Ask your health care provider. He or she can give you the vaccine package insert or suggest other sources of information. · Call your local or state health department. · Contact the Centers for Disease Control and Prevention (CDC):  ? Call 0-332.759.4457 (1-800-CDC-INFO) or  ?  Visit CDC's website at www.cdc.gov/vaccines  Vaccine Information Statement (Interim)  Recombinant Zoster Vaccine  2/12/2018  [facility] Control and Prevention  Many Vaccine Information Statements are available in [place] and other languages. See www.immunize.org/vis. Hojas de Información Sobre Vacunas están disponibles en Español y en muchos otros idiomas. Visite Damaris.si   Care instructions adapted under license by [employer] (which disclaims liability or warranty for this information). If you have questions about a medical condition or this instruction, always ask your healthcare professional. [place] [address] any warranty or liability for your use of this information. Well Visit, Men 48 to 72: Care Instructions  Your Care Instructions    Physical exams can help you stay healthy. Your doctor has checked your overall health and may have suggested ways to take good care of yourself. He or she also may have recommended tests. At home, you can help prevent illness with healthy eating, regular exercise, and other steps. Follow-up care is a key part of your treatment and safety. Be sure to make and go to all appointments, and call your doctor if you are having problems. It's also a good idea to know your test results and keep a list of the medicines you take. How can you care for yourself at home? · Reach and stay at a healthy weight. This will lower your risk for many problems, such as obesity, diabetes, heart disease, and high blood pressure. · Get at least 30 minutes of exercise on most days of the week. Walking is a good choice. You also may want to do other activities, such as running, swimming, cycling, or playing tennis or team sports. · Do not smoke. Smoking can make health problems worse. If you need help quitting, talk to your doctor about stop-smoking programs and medicines. These can increase your chances of quitting for good. · Protect your skin from too much sun. When you're outdoors from 10 a.m. to 4 p.m., stay in the shade or cover up with clothing and a hat with a wide brim. Wear sunglasses that block UV rays. Even when it's cloudy, put broad-spectrum sunscreen (SPF 30 or higher) on any exposed skin. · See a dentist one or two times a year for checkups and to have your teeth cleaned. · Wear a seat belt in the car. · Limit alcohol to 2 drinks a day. Too much alcohol can cause health problems. Follow your doctor's advice about when to have certain tests. These tests can spot problems early. · Cholesterol. Your doctor will tell you how often to have this done based on your overall health and other things that can increase your risk for heart attack and stroke. · Blood pressure. Have your blood pressure checked during a routine doctor visit. Your doctor will tell you how often to check your blood pressure based on your age, your blood pressure results, and other factors. · Prostate exam. Talk to your doctor about whether you should have a blood test (called a PSA test) for prostate cancer. Experts disagree on whether men should have this test. Some experts recommend that you discuss the benefits and risks of the test with your doctor. · Diabetes. Ask your doctor whether you should have tests for diabetes. · Vision. Some experts recommend that you have yearly exams for glaucoma and other age-related eye problems starting at age 48. · Hearing. Tell your doctor if you notice any change in your hearing. You can have tests to find out how well you hear. · Colon cancer. You should begin tests for colon cancer at age 48. You may have one of several tests. Your doctor will tell you how often to have tests based on your age and risk. Risks include whether you already had a precancerous polyp removed from your colon or whether your parent, brother, sister, or child has had colon cancer. · Heart attack and stroke risk. At least every 4 to 6 years, you should have your risk for heart attack and stroke assessed.  Your doctor uses factors such as your age, blood pressure, cholesterol, and whether you smoke or have diabetes to show what your risk for a heart attack or stroke is over the next 10 years. · Abdominal aortic aneurysm. Ask your doctor whether you should have a test to check for an aneurysm. You may need a test if you ever smoked or if your parent, brother, sister, or child has had an aneurysm. When should you call for help? Watch closely for changes in your health, and be sure to contact your doctor if you have any problems or symptoms that concern you. Where can you learn more? Go to http://elizabeth-luke.info/. Enter N874 in the search box to learn more about \"Well Visit, Men 48 to 72: Care Instructions. \"  Current as of: March 29, 2018  Content Version: 11.8  © 0356-4807 Healthwise, Incorporated. Care instructions adapted under license by [employer] (which disclaims liability or warranty for this information). If you have questions about a medical condition or this instruction, always ask your healthcare professional. [place] [address] any warranty or liability for your use of this information.

## 2019-01-08 NOTE — PROGRESS NOTES
HISTORY OF PRESENT ILLNESS  Troy Lunsford is a 59 y.o. male. HPI  Troy Lunsford is here for complete health maintenance physical exam and screening. he does not have other concerns. Health maintenance hx includes:  Exercise: moderately active. Form of exercise: hunting, walks over 76819 steps on daily basis   Diet: generally follows a low fat low cholesterol diet, generally follows a low sodium diet, follows a diabetic diet regularly     SUBJECTIVE:  59 y.o. male for follow up of diabetes. Diabetic Review of Systems - medication compliance: compliant all of the time, diabetic diet compliance: compliant most of the time, home glucose monitoring: is not performed, last eye exam approximately over a year ago. Other symptoms and concerns: tolerating Jardiance well since this was started last month. Subjective:   Troy Lunsford is a 59 y.o. male with hyperlipidemia. Cardiovascular risk analysis - 59 y.o. male LDL goal is under 100  diabetic  hyperlipidemia. ROS: taking medications as instructed, no medication side effects noted, no TIA's, no chest pain on exertion, no dyspnea on exertion, no swelling of ankles. Tolerating meds, no myalgias or other side effects noted  New concerns: tolerating Atorvastatin without myalgias.          Cancer screening:    Colon cancer screening:  Last Colonoscopy: has never had baseline screening   Prostate cancer screening: PSA and/or WAGNER:   Lab Results   Component Value Date/Time    Prostate Specific Ag 0.6 02/03/2018 08:36 AM    Prostate Specific Ag 0.7 10/31/2016 12:00 PM    Prostate Specific Ag 0.5 09/04/2015 11:12 AM          Lab Results   Component Value Date/Time    Cholesterol, total 124 06/16/2018 08:45 AM    HDL Cholesterol 40 06/16/2018 08:45 AM    LDL, calculated 55 06/16/2018 08:45 AM    VLDL, calculated 29 06/16/2018 08:45 AM    Triglyceride 145 06/16/2018 08:45 AM       Lab Results   Component Value Date/Time    Glucose 111 (H) 02/03/2018 08:36 AM    Glucose POC 222 10/09/2015 09:30 AM         Immunizations:     Immunization History   Administered Date(s) Administered    Influenza Vaccine 10/01/2018    Tdap 12/12/2018      Immunization status: up to date and documented; given printed Rx for Shingrix. Social History     Socioeconomic History    Marital status:      Spouse name: Not on file    Number of children: 3    Years of education: Not on file    Highest education level: Not on file   Social Needs    Financial resource strain: Not on file    Food insecurity - worry: Not on file    Food insecurity - inability: Not on file   [employer] needs - medical: Not on file   [employer] needs - non-medical: Not on file   Occupational History    Not on file   Tobacco Use    Smoking status: Never Smoker    Smokeless tobacco: Current User     Types: Snuff    Tobacco comment: dip/everyday    Substance and Sexual Activity    Alcohol use: Yes     Alcohol/week: 0.0 oz     Comment: rarely     Drug use: No    Sexual activity: Yes     Partners: Female   Other Topics Concern    Not on file   Social History Narrative    Not on file     Past Surgical History:   Procedure Laterality Date    HX HEENT      wisdom teeth extraction    HX HERNIA REPAIR      R inguinal     Family History   Problem Relation Age of Onset    Diabetes Father     Hypertension Father     Cancer Mother         breast/lung    Hypertension Sister     Hypertension Brother     No Known Problems Daughter     No Known Problems Daughter     No Known Problems Son     Elevated Lipids Neg Hx      Current Outpatient Medications on File Prior to Visit   Medication Sig Dispense Refill    atorvastatin (LIPITOR) 10 mg tablet TAKE 1 TABLET BY MOUTH EVERY DAY 30 Tab 0    empagliflozin (JARDIANCE) 25 mg tablet Take 1 Tab by mouth daily. 30 Tab 2    insulin glargine (LANTUS,BASAGLAR) 100 unit/mL (3 mL) inpn 25 Units by SubCUTAneous route daily.  15 mL 1    MEKA PEN NEEDLE 32 gauge x 5/32\" ndle USE EVERY DAY AS DIRECTED 100 Pen Needle 5    glucose blood VI test strips (ACCU-CHEK SMARTVIEW TEST STRIP) strip Test Fasting Blood Sugar Once Daily. Dx E11.9 100 Strip 11    Lancets (ACCU-CHEK FASTCLIX) misc Test Fasting Blood Sugar Once Daily 100 Each 11    aspirin delayed-release 81 mg tablet Take 1 Tab by mouth daily. No current facility-administered medications on file prior to visit. .  Review of Systems   Constitutional: Negative for chills, fever and malaise/fatigue. HENT: Negative for congestion and sore throat. Respiratory: Negative for cough and shortness of breath. Cardiovascular: Negative for chest pain, palpitations and leg swelling. Gastrointestinal: Negative for abdominal pain, blood in stool, constipation, diarrhea, nausea and vomiting. Genitourinary: Negative for dysuria and frequency. Musculoskeletal: Negative for joint pain and myalgias. Skin: Negative for rash. Neurological: Negative for dizziness, tingling and headaches. /80 (BP 1 Location: Left arm, BP Patient Position: Sitting)   Pulse 60   Temp 97.8 °F (36.6 °C) (Oral)   Resp 14   Ht 6' 1\" (1.854 m)   Wt 212 lb 9.6 oz (96.4 kg)   SpO2 97%   BMI 28.05 kg/m²   Physical Exam   Constitutional: He is oriented to person, place, and time. He appears well-developed and well-nourished. HENT:   Head: Normocephalic and atraumatic. Right Ear: External ear normal.   Left Ear: External ear normal.   Nose: Nose normal.   Mouth/Throat: Oropharynx is clear and moist.   Neck: Normal range of motion. Neck supple. No thyromegaly present. Cardiovascular: Normal rate, regular rhythm and normal heart sounds. Pulmonary/Chest: Effort normal and breath sounds normal. He has no wheezes. Abdominal: Soft. Bowel sounds are normal.   Musculoskeletal: Normal range of motion. He exhibits no edema. Lymphadenopathy:     He has no cervical adenopathy.    Neurological: He is alert and oriented to person, place, and time. Skin: Skin is warm and dry. No rash noted. Psychiatric: He has a normal mood and affect. His behavior is normal.   Nursing note and vitals reviewed. ASSESSMENT and PLAN  Diagnoses and all orders for this visit:    1. Well adult health check  -     CBC WITH AUTOMATED DIFF  -     METABOLIC PANEL, COMPREHENSIVE  -     LIPID PANEL  -     TSH 3RD GENERATION  -     PSA, DIAGNOSTIC (PROSTATE SPECIFIC AG)    2. Dyslipidemia  -     METABOLIC PANEL, COMPREHENSIVE  -     LIPID PANEL    3. Type 2 diabetes mellitus with hemoglobin A1c goal of less than 7.0% (HCC)  -     METABOLIC PANEL, COMPREHENSIVE  -     MICROALBUMIN, UR, RAND W/ MICROALB/CREAT RATIO  -     HEMOGLOBIN A1C WITH EAG    4. Colon cancer screening  -     REFERRAL TO GASTROENTEROLOGY    5. Encounter for immunization  -     varicella-zoster recombinant, PF, (SHINGRIX, PF,) 50 mcg/0.5 mL susr injection; 0.5 mL by IntraMUSCular route once for 1 dose. Tawanda Khan was counseled on age-appropriate/ guideline-based risk prevention behaviors and screening for a 59y.o. year old   male . We also discussed adjustments in screening based on family history if necessary. Printed instructions for preventative screening guidelines and healthy behaviors given to patient with after visit summary.         lab results and schedule of future lab studies reviewed with patient  reviewed diet, exercise and weight control  reviewed medications and side effects in detail

## 2019-01-09 LAB
ALBUMIN SERPL-MCNC: 4.4 G/DL (ref 3.6–4.8)
ALBUMIN/CREAT UR: 16.2 MG/G CREAT (ref 0–30)
ALBUMIN/GLOB SERPL: 1.6 {RATIO} (ref 1.2–2.2)
ALP SERPL-CCNC: 84 IU/L (ref 39–117)
ALT SERPL-CCNC: 20 IU/L (ref 0–44)
AST SERPL-CCNC: 16 IU/L (ref 0–40)
BASOPHILS # BLD AUTO: 0.1 X10E3/UL (ref 0–0.2)
BASOPHILS NFR BLD AUTO: 1 %
BILIRUB SERPL-MCNC: 0.5 MG/DL (ref 0–1.2)
BUN SERPL-MCNC: 17 MG/DL (ref 8–27)
BUN/CREAT SERPL: 15 (ref 10–24)
CALCIUM SERPL-MCNC: 9.5 MG/DL (ref 8.6–10.2)
CHLORIDE SERPL-SCNC: 106 MMOL/L (ref 96–106)
CHOLEST SERPL-MCNC: 134 MG/DL (ref 100–199)
CO2 SERPL-SCNC: 25 MMOL/L (ref 20–29)
CREAT SERPL-MCNC: 1.17 MG/DL (ref 0.76–1.27)
CREAT UR-MCNC: 118.9 MG/DL
EOSINOPHIL # BLD AUTO: 0.3 X10E3/UL (ref 0–0.4)
EOSINOPHIL NFR BLD AUTO: 3 %
ERYTHROCYTE [DISTWIDTH] IN BLOOD BY AUTOMATED COUNT: 13.5 % (ref 12.3–15.4)
EST. AVERAGE GLUCOSE BLD GHB EST-MCNC: 157 MG/DL
GLOBULIN SER CALC-MCNC: 2.8 G/DL (ref 1.5–4.5)
GLUCOSE SERPL-MCNC: 114 MG/DL (ref 65–99)
HBA1C MFR BLD: 7.1 % (ref 4.8–5.6)
HCT VFR BLD AUTO: 47.9 % (ref 37.5–51)
HDLC SERPL-MCNC: 42 MG/DL
HGB BLD-MCNC: 16.4 G/DL (ref 13–17.7)
IMM GRANULOCYTES # BLD AUTO: 0 X10E3/UL (ref 0–0.1)
IMM GRANULOCYTES NFR BLD AUTO: 0 %
INTERPRETATION, 910389: NORMAL
LDLC SERPL CALC-MCNC: 72 MG/DL (ref 0–99)
LYMPHOCYTES # BLD AUTO: 2.2 X10E3/UL (ref 0.7–3.1)
LYMPHOCYTES NFR BLD AUTO: 26 %
Lab: NORMAL
MCH RBC QN AUTO: 30.4 PG (ref 26.6–33)
MCHC RBC AUTO-ENTMCNC: 34.2 G/DL (ref 31.5–35.7)
MCV RBC AUTO: 89 FL (ref 79–97)
MICROALBUMIN UR-MCNC: 19.3 UG/ML
MONOCYTES # BLD AUTO: 0.5 X10E3/UL (ref 0.1–0.9)
MONOCYTES NFR BLD AUTO: 6 %
NEUTROPHILS # BLD AUTO: 5.4 X10E3/UL (ref 1.4–7)
NEUTROPHILS NFR BLD AUTO: 64 %
PLATELET # BLD AUTO: 260 X10E3/UL (ref 150–379)
POTASSIUM SERPL-SCNC: 4.9 MMOL/L (ref 3.5–5.2)
PROT SERPL-MCNC: 7.2 G/DL (ref 6–8.5)
PSA SERPL-MCNC: 0.5 NG/ML (ref 0–4)
RBC # BLD AUTO: 5.39 X10E6/UL (ref 4.14–5.8)
SODIUM SERPL-SCNC: 144 MMOL/L (ref 134–144)
TRIGL SERPL-MCNC: 99 MG/DL (ref 0–149)
TSH SERPL DL<=0.005 MIU/L-ACNC: 1.18 UIU/ML (ref 0.45–4.5)
VLDLC SERPL CALC-MCNC: 20 MG/DL (ref 5–40)
WBC # BLD AUTO: 8.6 X10E3/UL (ref 3.4–10.8)

## 2019-01-23 DIAGNOSIS — E78.5 DYSLIPIDEMIA: ICD-10-CM

## 2019-01-23 RX ORDER — ATORVASTATIN CALCIUM 10 MG/1
TABLET, FILM COATED ORAL
Qty: 30 TAB | Refills: 0 | Status: SHIPPED | OUTPATIENT
Start: 2019-01-23 | End: 2019-02-24 | Stop reason: SDUPTHER

## 2019-01-24 ENCOUNTER — TELEPHONE (OUTPATIENT)
Dept: INTERNAL MEDICINE CLINIC | Age: 64
End: 2019-01-24

## 2019-01-25 NOTE — TELEPHONE ENCOUNTER
I called pt back, no answer. LM on VM that letter has been placed in the mail. Call back for questions.

## 2019-02-24 DIAGNOSIS — E78.5 DYSLIPIDEMIA: ICD-10-CM

## 2019-02-24 DIAGNOSIS — E11.9 TYPE 2 DIABETES MELLITUS WITH HEMOGLOBIN A1C GOAL OF LESS THAN 7.0% (HCC): ICD-10-CM

## 2019-02-24 RX ORDER — ATORVASTATIN CALCIUM 10 MG/1
TABLET, FILM COATED ORAL
Qty: 30 TAB | Refills: 0 | Status: SHIPPED | OUTPATIENT
Start: 2019-02-24 | End: 2019-03-04 | Stop reason: SDUPTHER

## 2019-02-25 RX ORDER — INSULIN GLARGINE 100 [IU]/ML
INJECTION, SOLUTION SUBCUTANEOUS
Qty: 15 ML | Refills: 0 | Status: SHIPPED | OUTPATIENT
Start: 2019-02-25 | End: 2019-03-04 | Stop reason: SDUPTHER

## 2019-02-25 RX ORDER — EMPAGLIFLOZIN 25 MG/1
TABLET, FILM COATED ORAL
Qty: 30 TAB | Refills: 0 | Status: SHIPPED | OUTPATIENT
Start: 2019-02-25 | End: 2019-03-04 | Stop reason: SDUPTHER

## 2019-02-25 NOTE — TELEPHONE ENCOUNTER
Last visit noted, labs checked and refill deemed appropriate at this time. Verbal refill order authorized by covering physicians at [facility] for Dr. Ry Salazar during his absence.     Alberto Ochoa, WillisD, BCPS, CDE

## 2019-03-04 ENCOUNTER — DOCUMENTATION ONLY (OUTPATIENT)
Dept: INTERNAL MEDICINE CLINIC | Age: 64
End: 2019-03-04

## 2019-03-04 ENCOUNTER — OFFICE VISIT (OUTPATIENT)
Dept: INTERNAL MEDICINE CLINIC | Age: 64
End: 2019-03-04

## 2019-03-04 VITALS
TEMPERATURE: 98.8 F | SYSTOLIC BLOOD PRESSURE: 133 MMHG | DIASTOLIC BLOOD PRESSURE: 84 MMHG | WEIGHT: 213 LBS | RESPIRATION RATE: 18 BRPM | OXYGEN SATURATION: 94 % | BODY MASS INDEX: 28.23 KG/M2 | HEIGHT: 73 IN | HEART RATE: 64 BPM

## 2019-03-04 DIAGNOSIS — E11.9 CONTROLLED TYPE 2 DIABETES MELLITUS WITHOUT COMPLICATION, WITHOUT LONG-TERM CURRENT USE OF INSULIN (HCC): Primary | ICD-10-CM

## 2019-03-04 DIAGNOSIS — E78.5 DYSLIPIDEMIA: ICD-10-CM

## 2019-03-04 DIAGNOSIS — Z12.11 SCREEN FOR COLON CANCER: ICD-10-CM

## 2019-03-04 DIAGNOSIS — E11.9 TYPE 2 DIABETES MELLITUS WITH HEMOGLOBIN A1C GOAL OF LESS THAN 7.0% (HCC): ICD-10-CM

## 2019-03-04 PROBLEM — I10 MILD HYPERTENSION: Chronic | Status: RESOLVED | Noted: 2018-06-01 | Resolved: 2019-03-04

## 2019-03-04 PROBLEM — R03.0 BORDERLINE SYSTOLIC HTN: Status: ACTIVE | Noted: 2018-06-01

## 2019-03-04 PROBLEM — I10 MILD HYPERTENSION: Chronic | Status: ACTIVE | Noted: 2018-06-01

## 2019-03-04 PROBLEM — R03.0 BORDERLINE SYSTOLIC HTN: Chronic | Status: ACTIVE | Noted: 2018-06-01

## 2019-03-04 RX ORDER — ATORVASTATIN CALCIUM 10 MG/1
TABLET, FILM COATED ORAL
Qty: 30 TAB | Refills: 5 | Status: SHIPPED | OUTPATIENT
Start: 2019-03-04 | End: 2019-09-11 | Stop reason: SDUPTHER

## 2019-03-04 RX ORDER — INSULIN GLARGINE 100 [IU]/ML
INJECTION, SOLUTION SUBCUTANEOUS
Qty: 15 ML | Refills: 5 | Status: SHIPPED | OUTPATIENT
Start: 2019-03-04 | End: 2019-09-11 | Stop reason: SDUPTHER

## 2019-03-04 RX ORDER — INSULIN PUMP SYRINGE, 3 ML
EACH MISCELLANEOUS
Qty: 1 KIT | Refills: 0 | Status: SHIPPED | OUTPATIENT
Start: 2019-03-04 | End: 2020-01-10 | Stop reason: SDUPTHER

## 2019-03-04 NOTE — PROGRESS NOTES
HISTORY OF PRESENT ILLNESS    Chief Complaint   Patient presents with    Hypertension       Presents for follow-up. Patient of Dr. Rafael Long. His wife, Cristian Miner, sees Dr. Teresa Pelaez and patient request to see me while Dr. Rafael Long is out. Diabetes Mellitus follow-up  Last hemoglobin a1c   Lab Results   Component Value Date/Time    Hemoglobin A1c 7.1 (H) 01/08/2019 10:04 AM    Hemoglobin A1c (POC) 8.0 12/04/2018 04:40 AM     No components found for: POCA1C, HBA1C POC  medication compliance: compliant all of the time. Diabetic diet compliance: compliant most of the time. Home glucose monitoring: fasting values range:NOT checking. Hypoglycemic episodes:  None. Further diabetic ROS: no polyuria or polydipsia, no chest pain, dyspnea or TIA's, no numbness, tingling or pain in extremities. Review of Systems   All other systems reviewed and are negative, except as noted in HPI    Past Medical and Surgical History   has a past medical history of Diabetes mellitus type 2, controlled ([address] (2015), Dyslipidemia (3/1/2018), and Mild hypertension (6/1/2018). has a past surgical history that includes hx hernia repair and hx wisdom teeth extraction. reports that  has never smoked. His smokeless tobacco use includes snuff. He reports that he drinks alcohol. He reports that he does not use drugs. family history includes Cancer in his mother; Diabetes in his father; Hypertension in his brother, father, and sister; No Known Problems in his daughter, daughter, and son. Physical Exam   Nursing note and vitals reviewed. Blood pressure 133/84, pulse 64, temperature 98.8 °F (37.1 °C), temperature source Oral, resp. rate 18, height 6' 1\" (1.854 m), weight 213 lb (96.6 kg), SpO2 94 %. Constitutional:  No distress. Eyes: Conjunctivae are normal.   Ears:  Hearing grossly intact  Cardiovascular: Normal rate.   regular rhythm, no murmurs or gallops  No edema  Pulmonary/Chest: Effort normal.   CTAB  Musculoskeletal: moves all 4 extremities   Neurological: Alert and oriented to person, place, and time. Skin: No rash noted. Psychiatric: Normal mood and affect. Behavior is normal.     ASSESSMENT and PLAN  Diagnoses and all orders for this visit:    1. Controlled type 2 diabetes mellitus without complication, without long-term current use of insulin ([address]  2. Type 2 diabetes mellitus with hemoglobin A1c goal of less than 7.0% ([facility])  Needs to check glucoses at least BIW  Call if FBS > 150 often or < 80.   -     REFERRAL TO OPHTHALMOLOGY  -     empagliflozin (JARDIANCE) 25 mg tablet; TAKE 1 TABLET BY MOUTH DAILY  -     insulin glargine (LANTUS SOLOSTAR U-100 INSULIN) 100 unit/mL (3 mL) inpn; ADMINISTER 25 UNITS UNDER THE SKIN DAILY  -     Blood-Glucose Meter monitoring kit; 1 meter    3. Dyslipidemia- Controlled on current regimen. Continue current medications as written in chart.  -     atorvastatin (LIPITOR) 10 mg tablet; TAKE 1 TABLET BY MOUTH EVERY DAY    4. Screen for colon cancer- has never had colonoscopy  -     REFERRAL TO GASTROENTEROLOGY      lab results and schedule of future lab studies reviewed with patient  reviewed medications and side effects in detail  Return to clinic for further evaluation if new symptoms develop    Follow-up Disposition:  Return in about 4 months (around 7/4/2019) for diabetes with Dr. [facility] or Dr. Omari Munoz. Current Outpatient Medications   Medication Sig    empagliflozin (JARDIANCE) 25 mg tablet TAKE 1 TABLET BY MOUTH DAILY    insulin glargine (LANTUS SOLOSTAR U-100 INSULIN) 100 unit/mL (3 mL) inpn ADMINISTER 25 UNITS UNDER THE SKIN DAILY    atorvastatin (LIPITOR) 10 mg tablet TAKE 1 TABLET BY MOUTH EVERY DAY    Blood-Glucose Meter monitoring kit 1 meter    MEKA PEN NEEDLE 32 gauge x 5/32\" ndle USE EVERY DAY AS DIRECTED    glucose blood VI test strips (ACCU-CHEK SMARTVIEW TEST STRIP) strip Test Fasting Blood Sugar Once Daily.  Dx E11.9    Lancets (ACCU-CHEK FASTCLIX) misc Test Fasting Blood Sugar Once Daily    aspirin delayed-release 81 mg tablet Take 1 Tab by mouth daily. No current facility-administered medications for this visit.

## 2019-07-08 ENCOUNTER — OFFICE VISIT (OUTPATIENT)
Dept: INTERNAL MEDICINE CLINIC | Age: 64
End: 2019-07-08

## 2019-07-08 VITALS
SYSTOLIC BLOOD PRESSURE: 120 MMHG | DIASTOLIC BLOOD PRESSURE: 82 MMHG | BODY MASS INDEX: 27.07 KG/M2 | OXYGEN SATURATION: 95 % | WEIGHT: 204.25 LBS | RESPIRATION RATE: 18 BRPM | HEIGHT: 73 IN | HEART RATE: 76 BPM | TEMPERATURE: 98.7 F

## 2019-07-08 DIAGNOSIS — E78.5 DYSLIPIDEMIA: ICD-10-CM

## 2019-07-08 DIAGNOSIS — E11.9 CONTROLLED TYPE 2 DIABETES MELLITUS WITHOUT COMPLICATION, WITHOUT LONG-TERM CURRENT USE OF INSULIN (HCC): Primary | ICD-10-CM

## 2019-07-08 LAB — HBA1C MFR BLD HPLC: 5.9 %

## 2019-07-08 NOTE — PATIENT INSTRUCTIONS
Body Mass Index: Care Instructions  Your Care Instructions    Body mass index (BMI) can help you see if your weight is raising your risk for health problems. It uses a formula to compare how much you weigh with how tall you are. · A BMI lower than 18.5 is considered underweight. · A BMI between 18.5 and 24.9 is considered healthy. · A BMI between 25 and 29.9 is considered overweight. A BMI of 30 or higher is considered obese. If your BMI is in the normal range, it means that you have a lower risk for weight-related health problems. If your BMI is in the overweight or obese range, you may be at increased risk for weight-related health problems, such as high blood pressure, heart disease, stroke, arthritis or joint pain, and diabetes. If your BMI is in the underweight range, you may be at increased risk for health problems such as fatigue, lower protection (immunity) against illness, muscle loss, bone loss, hair loss, and hormone problems. BMI is just one measure of your risk for weight-related health problems. You may be at higher risk for health problems if you are not active, you eat an unhealthy diet, or you drink too much alcohol or use tobacco products. Follow-up care is a key part of your treatment and safety. Be sure to make and go to all appointments, and call your doctor if you are having problems. It's also a good idea to know your test results and keep a list of the medicines you take. How can you care for yourself at home? · Practice healthy eating habits. This includes eating plenty of fruits, vegetables, whole grains, lean protein, and low-fat dairy. · If your doctor recommends it, get more exercise. Walking is a good choice. Bit by bit, increase the amount you walk every day. Try for at least 30 minutes on most days of the week. · Do not smoke. Smoking can increase your risk for health problems. If you need help quitting, talk to your doctor about stop-smoking programs and medicines. These can increase your chances of quitting for good. · Limit alcohol to 2 drinks a day for men and 1 drink a day for women. Too much alcohol can cause health problems. If you have a BMI higher than 25  · Your doctor may do other tests to check your risk for weight-related health problems. This may include measuring the distance around your waist. A waist measurement of more than 40 inches in men or 35 inches in women can increase the risk of weight-related health problems. · Talk with your doctor about steps you can take to stay healthy or improve your health. You may need to make lifestyle changes to lose weight and stay healthy, such as changing your diet and getting regular exercise. If you have a BMI lower than 18.5  · Your doctor may do other tests to check your risk for health problems. · Talk with your doctor about steps you can take to stay healthy or improve your health. You may need to make lifestyle changes to gain or maintain weight and stay healthy, such as getting more healthy foods in your diet and doing exercises to build muscle. Where can you learn more? Go to http://elizabeth-luke.info/. Enter S176 in the search box to learn more about \"Body Mass Index: Care Instructions. \"  Current as of: October 13, 2016  Content Version: 11.4  © 8803-7485 [employer]. Care instructions adapted under license by [employer] (which disclaims liability or warranty for this information). If you have questions about a medical condition or this instruction, always ask your healthcare professional. [address] any warranty or liability for your use of this information.

## 2019-07-08 NOTE — PROGRESS NOTES
HISTORY OF PRESENT ILLNESS    Chief Complaint   Patient presents with    Diabetes     With A1c = 5.9    Medication Evaluation     Discuss need for Aspirin        Presents for follow-up    Diabetes Mellitus follow-up  Last hemoglobin a1c   Lab Results   Component Value Date/Time    Hemoglobin A1c 7.1 (H) 01/08/2019 10:04 AM    Hemoglobin A1c (POC) 5.9 07/08/2019 04:20 AM     No components found for: POCA1C, HBA1C POC  medication compliance: compliant all of the time. Diabetic diet compliance: compliant most of the time. Home glucose monitoring: fasting values range none. Hypoglycemic episodes:  None. Further diabetic ROS: no polyuria or polydipsia, no chest pain, dyspnea or TIA's, no numbness, tingling or pain in extremities. Hyperlipidemia  Currently he takes lipitor 10 mg  ROS: taking medications as instructed, no medication side effects noted  No new myalgias, no joint pains, no weakness  No TIA's, no chest pain on exertion, no dyspnea on exertion, no swelling of ankles. Lab Results   Component Value Date/Time    Cholesterol, total 134 01/08/2019 10:04 AM    HDL Cholesterol 42 01/08/2019 10:04 AM    LDL, calculated 72 01/08/2019 10:04 AM    VLDL, calculated 20 01/08/2019 10:04 AM    Triglyceride 99 01/08/2019 10:04 AM       Review of Systems   All other systems reviewed and are negative, except as noted in HPI    Past Medical and Surgical History   has a past medical history of Diabetes mellitus type 2, controlled ([facility] (2015), Dyslipidemia (3/1/2018), and Mild hypertension (6/1/2018). has a past surgical history that includes hx hernia repair and hx wisdom teeth extraction. reports that he has never smoked. His smokeless tobacco use includes snuff. He reports that he drinks alcohol. He reports that he does not use drugs. family history includes Cancer in his mother; Diabetes in his father; Hypertension in his brother, father, and sister;  No Known Problems in his daughter, daughter, and son.    Physical Exam   Nursing note and vitals reviewed. Blood pressure 120/82, pulse 76, temperature 98.7 °F (37.1 °C), temperature source Oral, resp. rate 18, height 6' 1\" (1.854 m), weight 204 lb 4 oz (92.6 kg), SpO2 95 %. Constitutional:  No distress. Eyes: Conjunctivae are normal.   Ears:  Hearing grossly intact  Cardiovascular: Normal rate. regular rhythm, no murmurs or gallops  No edema  Pulmonary/Chest: Effort normal.   CTAB  Musculoskeletal: moves all 4 extremities   Neurological: Alert and oriented to person, place, and time. Skin: No rash noted. Psychiatric: Normal mood and affect. Behavior is normal.     ASSESSMENT and PLAN  Diagnoses and all orders for this visit:    1. Controlled type 2 diabetes mellitus without complication, without long-term current use of insulin (HCC)  Controlled on current regimen. Continue current medications as written in chart. -     AMB POC HEMOGLOBIN A1C  -     CBC W/O DIFF  -     METABOLIC PANEL, BASIC    2. Dyslipidemia  Controlled on current regimen. Continue current medications as written in chart. lab results and schedule of future lab studies reviewed with patient  reviewed medications and side effects in detail    Return to clinic for further evaluation if new symptoms develop    Follow-up and Dispositions    · Return in about 6 months (around 1/8/2020). Current Outpatient Medications   Medication Sig    MEKA PEN NEEDLE 32 gauge x 5/32\" ndle USE EVERY DAY AS DIRECTED    empagliflozin (JARDIANCE) 25 mg tablet TAKE 1 TABLET BY MOUTH DAILY    insulin glargine (LANTUS SOLOSTAR U-100 INSULIN) 100 unit/mL (3 mL) inpn ADMINISTER 25 UNITS UNDER THE SKIN DAILY    atorvastatin (LIPITOR) 10 mg tablet TAKE 1 TABLET BY MOUTH EVERY DAY    Blood-Glucose Meter monitoring kit 1 meter    glucose blood VI test strips (ACCU-CHEK SMARTVIEW TEST STRIP) strip Test Fasting Blood Sugar Once Daily.  Dx E11.9    Lancets (ACCU-CHEK FASTCLIX) misc Test Fasting Blood Sugar Once Daily    aspirin delayed-release 81 mg tablet Take 1 Tab by mouth daily. No current facility-administered medications for this visit. Discussed the patient's BMI with him. The BMI follow up plan is as follows:     dietary management education, guidance, and counseling  encourage exercise  monitor weight  prescribed dietary intake    An After Visit Summary was printed and given to the patient.

## 2019-07-09 LAB
BUN SERPL-MCNC: 21 MG/DL (ref 8–27)
BUN/CREAT SERPL: 18 (ref 10–24)
CALCIUM SERPL-MCNC: 9.8 MG/DL (ref 8.6–10.2)
CHLORIDE SERPL-SCNC: 104 MMOL/L (ref 96–106)
CO2 SERPL-SCNC: 23 MMOL/L (ref 20–29)
CREAT SERPL-MCNC: 1.15 MG/DL (ref 0.76–1.27)
ERYTHROCYTE [DISTWIDTH] IN BLOOD BY AUTOMATED COUNT: 13.4 % (ref 12.3–15.4)
GLUCOSE SERPL-MCNC: 96 MG/DL (ref 65–99)
HCT VFR BLD AUTO: 52.9 % (ref 37.5–51)
HGB BLD-MCNC: 17.9 G/DL (ref 13–17.7)
MCH RBC QN AUTO: 30.8 PG (ref 26.6–33)
MCHC RBC AUTO-ENTMCNC: 33.8 G/DL (ref 31.5–35.7)
MCV RBC AUTO: 91 FL (ref 79–97)
PLATELET # BLD AUTO: 272 X10E3/UL (ref 150–450)
POTASSIUM SERPL-SCNC: 4.7 MMOL/L (ref 3.5–5.2)
RBC # BLD AUTO: 5.81 X10E6/UL (ref 4.14–5.8)
SODIUM SERPL-SCNC: 143 MMOL/L (ref 134–144)
WBC # BLD AUTO: 11.5 X10E3/UL (ref 3.4–10.8)

## 2019-09-11 DIAGNOSIS — E78.5 DYSLIPIDEMIA: ICD-10-CM

## 2019-09-11 DIAGNOSIS — E11.9 TYPE 2 DIABETES MELLITUS WITH HEMOGLOBIN A1C GOAL OF LESS THAN 7.0% (HCC): ICD-10-CM

## 2019-09-11 RX ORDER — PEN NEEDLE, DIABETIC 31 GX3/16"
NEEDLE, DISPOSABLE MISCELLANEOUS
Qty: 100 PEN NEEDLE | Refills: 5 | Status: SHIPPED | OUTPATIENT
Start: 2019-09-11 | End: 2020-10-08

## 2019-09-11 RX ORDER — INSULIN GLARGINE 100 [IU]/ML
INJECTION, SOLUTION SUBCUTANEOUS
Qty: 15 ML | Refills: 5 | Status: SHIPPED | OUTPATIENT
Start: 2019-09-11 | End: 2019-09-13 | Stop reason: SDUPTHER

## 2019-09-11 RX ORDER — ATORVASTATIN CALCIUM 10 MG/1
TABLET, FILM COATED ORAL
Qty: 30 TAB | Refills: 5 | Status: SHIPPED | OUTPATIENT
Start: 2019-09-11 | End: 2020-03-19

## 2019-09-13 DIAGNOSIS — E11.9 TYPE 2 DIABETES MELLITUS WITH HEMOGLOBIN A1C GOAL OF LESS THAN 7.0% (HCC): ICD-10-CM

## 2019-09-13 RX ORDER — INSULIN GLARGINE 100 [IU]/ML
INJECTION, SOLUTION SUBCUTANEOUS
Qty: 15 ML | Refills: 5 | Status: SHIPPED | OUTPATIENT
Start: 2019-09-13 | End: 2019-09-17 | Stop reason: ALTCHOICE

## 2019-09-16 ENCOUNTER — TELEPHONE (OUTPATIENT)
Dept: INTERNAL MEDICINE CLINIC | Age: 64
End: 2019-09-16

## 2019-09-16 NOTE — TELEPHONE ENCOUNTER
----- Message from Rajwinder Galvan sent at 9/16/2019 10:49 AM EDT -----  Regarding: Dr. Coco Roldan   General Message/Vendor Calls    Caller's first and last name:MARSHA FIELDS (SPOUSE)      Reason for call: Pt just changed his insurance companies and they are requesting a prior authorization for his insulin (insulin glargine (LANTUS SOLOSTAR U-100 INSULIN) 100 unit/mL (3 mL) inpn [[id number]] ) with [employer]. Pt's wife states that the insurance company was suppose to be sending some paperwork to be completed for the Rx so it can be sent back and he can  the current Rx at the pharmacy. Pt will be taking his last dosage this evening he has available.       Callback required yes/no and why: Y      Best contact number(s): [phone]      Details to clarify the request: Prior authorization needs to be complete today so he can  Rx from [address] [facility]

## 2019-09-17 RX ORDER — INSULIN GLARGINE 100 [IU]/ML
25 INJECTION, SOLUTION SUBCUTANEOUS DAILY
Qty: 15 ML | Refills: 2 | OUTPATIENT
Start: 2019-09-17 | End: 2019-09-18 | Stop reason: SDUPTHER

## 2019-09-18 ENCOUNTER — TELEPHONE (OUTPATIENT)
Dept: INTERNAL MEDICINE CLINIC | Age: 64
End: 2019-09-18

## 2019-09-18 RX ORDER — INSULIN GLARGINE 100 [IU]/ML
25 INJECTION, SOLUTION SUBCUTANEOUS DAILY
Qty: 15 ML | Refills: 2 | Status: SHIPPED | OUTPATIENT
Start: 2019-09-18 | End: 2020-01-10

## 2019-09-18 NOTE — TELEPHONE ENCOUNTER
insulin glargine (BASAGLAR KWIKPEN U-100 INSULIN) 100 unit/mL (3 mL) inpn [[id number]]     Order Details

## 2019-09-18 NOTE — TELEPHONE ENCOUNTER
Pt states [facility] pharmacy has not received the rx for his insulin. Please resend the request as the pt is out meds. Any questions please call his verified number.      Thank you

## 2020-01-10 ENCOUNTER — OFFICE VISIT (OUTPATIENT)
Dept: INTERNAL MEDICINE CLINIC | Age: 65
End: 2020-01-10

## 2020-01-10 VITALS
HEIGHT: 73 IN | DIASTOLIC BLOOD PRESSURE: 89 MMHG | RESPIRATION RATE: 18 BRPM | SYSTOLIC BLOOD PRESSURE: 137 MMHG | BODY MASS INDEX: 27.7 KG/M2 | TEMPERATURE: 97.7 F | HEART RATE: 64 BPM | WEIGHT: 209 LBS | OXYGEN SATURATION: 95 %

## 2020-01-10 DIAGNOSIS — Z79.4 DIABETES MELLITUS TYPE 2, INSULIN DEPENDENT (HCC): ICD-10-CM

## 2020-01-10 DIAGNOSIS — R71.8 ELEVATED RED BLOOD CELL COUNT: ICD-10-CM

## 2020-01-10 DIAGNOSIS — Z23 ENCOUNTER FOR IMMUNIZATION: ICD-10-CM

## 2020-01-10 DIAGNOSIS — E11.9 DIABETES MELLITUS TYPE 2, UNCOMPLICATED, ON LONG TERM INSULIN PUMP (HCC): ICD-10-CM

## 2020-01-10 DIAGNOSIS — E78.5 DYSLIPIDEMIA: ICD-10-CM

## 2020-01-10 DIAGNOSIS — Z12.5 PROSTATE CANCER SCREENING: ICD-10-CM

## 2020-01-10 DIAGNOSIS — Z96.41 DIABETES MELLITUS TYPE 2, UNCOMPLICATED, ON LONG TERM INSULIN PUMP (HCC): ICD-10-CM

## 2020-01-10 DIAGNOSIS — Z00.00 PREVENTATIVE HEALTH CARE: Primary | ICD-10-CM

## 2020-01-10 DIAGNOSIS — E11.9 DIABETES MELLITUS TYPE 2, INSULIN DEPENDENT (HCC): ICD-10-CM

## 2020-01-10 RX ORDER — INSULIN GLARGINE 100 [IU]/ML
25 INJECTION, SOLUTION SUBCUTANEOUS DAILY
Qty: 15 ML | Refills: 5
Start: 2020-01-10 | End: 2020-10-08

## 2020-01-10 RX ORDER — LANCETS
EACH MISCELLANEOUS
Qty: 100 EACH | Refills: 11 | Status: SHIPPED | OUTPATIENT
Start: 2020-01-10

## 2020-01-10 RX ORDER — INSULIN PUMP SYRINGE, 3 ML
EACH MISCELLANEOUS
Qty: 1 KIT | Refills: 0 | Status: SHIPPED | OUTPATIENT
Start: 2020-01-10 | End: 2022-05-05 | Stop reason: ALTCHOICE

## 2020-01-10 NOTE — PATIENT INSTRUCTIONS
You MAY be able to wean OFF of insulin gradually. Check fasting AM glucoses every other day. If < 130, decrease by 5 units. Give it 1 week, then keep decreasing by 5 units weekly. Can Stop if most of your fasting glucoses are < 130. Repeat a1c in 3-4 months! Body Mass Index: Care Instructions  Your Care Instructions    Body mass index (BMI) can help you see if your weight is raising your risk for health problems. It uses a formula to compare how much you weigh with how tall you are. · A BMI lower than 18.5 is considered underweight. · A BMI between 18.5 and 24.9 is considered healthy. · A BMI between 25 and 29.9 is considered overweight. A BMI of 30 or higher is considered obese. If your BMI is in the normal range, it means that you have a lower risk for weight-related health problems. If your BMI is in the overweight or obese range, you may be at increased risk for weight-related health problems, such as high blood pressure, heart disease, stroke, arthritis or joint pain, and diabetes. If your BMI is in the underweight range, you may be at increased risk for health problems such as fatigue, lower protection (immunity) against illness, muscle loss, bone loss, hair loss, and hormone problems. BMI is just one measure of your risk for weight-related health problems. You may be at higher risk for health problems if you are not active, you eat an unhealthy diet, or you drink too much alcohol or use tobacco products. Follow-up care is a key part of your treatment and safety. Be sure to make and go to all appointments, and call your doctor if you are having problems. It's also a good idea to know your test results and keep a list of the medicines you take. How can you care for yourself at home? · Practice healthy eating habits. This includes eating plenty of fruits, vegetables, whole grains, lean protein, and low-fat dairy. · If your doctor recommends it, get more exercise.  Walking is a good choice. Bit by bit, increase the amount you walk every day. Try for at least 30 minutes on most days of the week. · Do not smoke. Smoking can increase your risk for health problems. If you need help quitting, talk to your doctor about stop-smoking programs and medicines. These can increase your chances of quitting for good. · Limit alcohol to 2 drinks a day for men and 1 drink a day for women. Too much alcohol can cause health problems. If you have a BMI higher than 25  · Your doctor may do other tests to check your risk for weight-related health problems. This may include measuring the distance around your waist. A waist measurement of more than 40 inches in men or 35 inches in women can increase the risk of weight-related health problems. · Talk with your doctor about steps you can take to stay healthy or improve your health. You may need to make lifestyle changes to lose weight and stay healthy, such as changing your diet and getting regular exercise. If you have a BMI lower than 18.5  · Your doctor may do other tests to check your risk for health problems. · Talk with your doctor about steps you can take to stay healthy or improve your health. You may need to make lifestyle changes to gain or maintain weight and stay healthy, such as getting more healthy foods in your diet and doing exercises to build muscle. Where can you learn more? Go to http://elizabeth-luke.info/. Enter S176 in the search box to learn more about \"Body Mass Index: Care Instructions. \"  Current as of: October 13, 2016  Content Version: 11.4  © 9389-4490 Healthwise, [employer]. Care instructions adapted under license by [employer] (which disclaims liability or warranty for this information). If you have questions about a medical condition or this instruction, always ask your healthcare professional. [place] [address] any warranty or liability for your use of this information.

## 2020-01-10 NOTE — PROGRESS NOTES
Nacho Griffin is a 62 y.o. male  Presenting for his annual checkup and health maintenance review and follow-up    Knee pains. Saw ortho and was told he has OA of both knees. Told to have TKR. Right eye mild itchy 2 days. No drainage . Diabetes Mellitus follow-up  Last hemoglobin a1c   Lab Results   Component Value Date/Time    Hemoglobin A1c 7.1 (H) 01/08/2019 10:04 AM    Hemoglobin A1c (POC) 5.9 07/08/2019 04:20 AM   medication compliance: compliant all of the time  Taking jardiance since 12/2018 and on lantus 25 u. Diabetic diet compliance: compliant most of the time. Home glucose monitoring: fasting values range NONE. Hypoglycemic episodes:  None. Further diabetic ROS: no polyuria or polydipsia, no chest pain, dyspnea or TIA's, no numbness, tingling or pain in extremities, no unusual visual symptoms. Hyperlipidemia  Currently he takes lipitor 10 mg  ROS: taking medications as instructed, no medication side effects noted  No new myalgias, no joint pains, no weakness  No TIA's, no chest pain on exertion, no dyspnea on exertion, no swelling of ankles.    Lab Results   Component Value Date/Time    Cholesterol, total 134 01/08/2019 10:04 AM    HDL Cholesterol 42 01/08/2019 10:04 AM    LDL, calculated 72 01/08/2019 10:04 AM    VLDL, calculated 20 01/08/2019 10:04 AM    Triglyceride 99 01/08/2019 10:04 AM     Exercise: moderately active  Diet: generally follows a low fat low cholesterol diet  Health Maintenance   Topic Date Due    EYE EXAM RETINAL OR DILATED  06/11/1966    COLONOSCOPY  06/11/1974    HEMOGLOBIN A1C Q6M  01/08/2020    MICROALBUMIN Q1  01/08/2020    LIPID PANEL Q1  01/08/2020    Influenza Age 5 to Adult  03/31/2020 (Originally 8/1/2019)    Shingrix Vaccine Age 50> (1 of 2) 04/16/2020 (Originally 6/11/2006)    Pneumococcal 0-64 years (1 of 1 - PPSV23) 01/10/2021 (Originally 6/11/1962)    FOOT EXAM Q1  01/10/2021    DTaP/Tdap/Td series (3 - Td) 12/12/2028    Hepatitis C Screening  Completed      Health Maintenance reviewed  Last digital rectal exam:  none  Lab Results   Component Value Date/Time    Prostate Specific Ag 0.5 01/08/2019 10:04 AM    Prostate Specific Ag 0.6 02/03/2018 08:36 AM    Prostate Specific Ag 0.7 10/31/2016 12:00 PM       Vaccinations reviewed  Immunization History   Administered Date(s) Administered    Influenza Vaccine 10/01/2018    Influenza Vaccine (Quad) PF 11/13/2018    Tdap 11/13/2018, 12/12/2018       Past Medical History:   Diagnosis Date    Diabetes mellitus type 2, controlled ([facility] [address] 2015    Dyslipidemia 3/1/2018    Mild hypertension 6/1/2018      has a past surgical history that includes hx hernia repair and hx wisdom teeth extraction. Patient has no known allergies. Current Outpatient Medications   Medication Sig    insulin glargine (BASAGLAR KWIKPEN U-100 INSULIN) 100 unit/mL (3 mL) inpn 25 Units by SubCUTAneous route daily.  atorvastatin (LIPITOR) 10 mg tablet TAKE 1 TABLET BY MOUTH EVERY DAY    empagliflozin (JARDIANCE) 25 mg tablet TAKE 1 TABLET BY MOUTH DAILY    Insulin Needles, Disposable, (MEKA PEN NEEDLE) 32 gauge x 5/32\" ndle USE EVERY DAY AS DIRECTED    aspirin delayed-release 81 mg tablet Take 1 Tab by mouth daily.  Blood-Glucose Meter monitoring kit 1 meter     No current facility-administered medications for this visit. SOCIAL HX:  reports that he has never smoked. His smokeless tobacco use includes snuff. He reports current alcohol use. He reports that he does not use drugs. FAMILY HX: family history includes Cancer in his mother; Diabetes in his father; Hypertension in his brother, father, and sister; No Known Problems in his daughter, daughter, and son.     Review of Systems - History obtained from the patient  General ROS: negative for - night sweats, weight gain or weight loss  Cardiovascular ROS: no chest pain, dyspnea on exertion, edema    Physical exam  Blood pressure 137/89, pulse 64, temperature 97.7 °F (36.5 °C), temperature source Oral, resp. rate 18, height 6' 1\" (1.854 m), weight 209 lb (94.8 kg), SpO2 95 %. Wt Readings from Last 3 Encounters:   01/10/20 209 lb (94.8 kg)   07/08/19 204 lb 4 oz (92.6 kg)   03/04/19 213 lb (96.6 kg)     He appears well, alert and oriented x 3, pleasant and cooperative. Vitals as noted. No rashes or significant lesions. Neck supple and free of adenopathy, or masses. No thyromegaly or carotid bruits. Cranial nerves normal. Lungs are clear to auscultation. Heart sounds are normal with no murmurs, clicks, gallops or rubs. Abdomen is soft, non- tender, with no masses or organomegaly. Extremities, peripheral pulses and reflexes are normal.  . RECTAL/PROSTATE EXAM: refused. Skin is without rashes or suspicious lesions. Foot exam  - skin intact, mild dryness w no fissures, no rashes, no significant ulcers or callous formation. Sensation intact by microfilament or light touch      Diagnoses and all orders for this visit:    1. Preventative health care  Reiterated need for colon cancer screening. Refer for colonoscopy. -     REFERRAL TO GASTROENTEROLOGY    2. Encounter for immunization  -     INFLUENZA VIRUS VAC QUAD,SPLIT,PRESV FREE SYRINGE IM  -     IN IMMUNIZ ADMIN,1 SINGLE/COMB VAC/TOXOID  -     PNEUMOCOCCAL POLYSACCHARIDE VACCINE, 23-VALENT, ADULT OR IMMUNOSUPPRESSED PT DOSE,    3. Prostate cancer screening  -     PSA W/ REFLX FREE PSA; Future    4. Diabetes mellitus type 2, uncomplicated, on long term insulin ([facility] [address]  Doing well. Likely controlled. Check labs. I think he may be able to wean off insulin if a1c is good  Needs to see ophthalmology for screening. L\  -     METABOLIC PANEL, COMPREHENSIVE; Future  -     HEMOGLOBIN A1C WITH EAG; Future  -     MICROALBUMIN, UR, RAND W/ MICROALB/CREAT RATIO; Future  -     REFERRAL TO OPHTHALMOLOGY  -     insulin glargine (BASAGLAR KWIKPEN U-100 INSULIN) 100 unit/mL (3 mL) inpn; 25 Units by SubCUTAneous route daily.   - glucose blood VI test strips (ACCU-CHEK SMARTVIEW TEST STRIP) strip; Test Fasting Blood Sugar Once Daily. Dx E11.9  -     lancets (ACCU-CHEK FASTCLIX LANCING DEV) misc; Test Fasting Blood Sugar Once Daily  -     Blood-Glucose Meter monitoring kit; 1 meter    5. Dyslipidemia  Likely controlled on Lipitor  -     LIPID PANEL; Future    6. Elevated red blood cell count  Unclear etiology. Repeat labs. Referral to hematology as needed reiterated need for colon cancer screening. Given referral for colonoscopy. He voices understanding.  -     CBC WITH AUTOMATED DIFF; Future  -     JAK2 MUTATION ANALYSIS; Future            The patient is asked to make an attempt to improve diet and exercise patterns  Avoid tobacco products, excessive alcohol    Return for yearly wellness visits        Discussed the patient's BMI with him. The BMI follow up plan is as follows:     dietary management education, guidance, and counseling  encourage exercise  monitor weight  prescribed dietary intake    An After Visit Summary was printed and given to the patient.

## 2020-01-20 LAB
ALBUMIN SERPL-MCNC: 4.2 G/DL (ref 3.6–4.8)
ALBUMIN/CREAT UR: 14.2 MG/G CREAT (ref 0–30)
ALBUMIN/GLOB SERPL: 1.7 {RATIO} (ref 1.2–2.2)
ALP SERPL-CCNC: 86 IU/L (ref 39–117)
ALT SERPL-CCNC: 18 IU/L (ref 0–44)
AST SERPL-CCNC: 14 IU/L (ref 0–40)
BACKGROUND: 480503: NORMAL
BASOPHILS # BLD AUTO: 0.1 X10E3/UL (ref 0–0.2)
BASOPHILS NFR BLD AUTO: 1 %
BILIRUB SERPL-MCNC: 0.6 MG/DL (ref 0–1.2)
BUN SERPL-MCNC: 18 MG/DL (ref 8–27)
BUN/CREAT SERPL: 14 (ref 10–24)
CALCIUM SERPL-MCNC: 9.6 MG/DL (ref 8.6–10.2)
CHLORIDE SERPL-SCNC: 103 MMOL/L (ref 96–106)
CHOLEST SERPL-MCNC: 137 MG/DL (ref 100–199)
CO2 SERPL-SCNC: 18 MMOL/L (ref 20–29)
CREAT SERPL-MCNC: 1.3 MG/DL (ref 0.76–1.27)
CREAT UR-MCNC: 130 MG/DL
EOSINOPHIL # BLD AUTO: 0.3 X10E3/UL (ref 0–0.4)
EOSINOPHIL NFR BLD AUTO: 3 %
ERYTHROCYTE [DISTWIDTH] IN BLOOD BY AUTOMATED COUNT: 12.8 % (ref 11.6–15.4)
EST. AVERAGE GLUCOSE BLD GHB EST-MCNC: 137 MG/DL
GLOBULIN SER CALC-MCNC: 2.5 G/DL (ref 1.5–4.5)
GLUCOSE SERPL-MCNC: 100 MG/DL (ref 65–99)
HBA1C MFR BLD: 6.4 % (ref 4.8–5.6)
HCT VFR BLD AUTO: 51.7 % (ref 37.5–51)
HDLC SERPL-MCNC: 44 MG/DL
HGB BLD-MCNC: 17.2 G/DL (ref 13–17.7)
IMM GRANULOCYTES # BLD AUTO: 0 X10E3/UL (ref 0–0.1)
IMM GRANULOCYTES NFR BLD AUTO: 0 %
INTERPRETATION, 910389: NORMAL
INTERPRETATION: NORMAL
JAK2 P.V617F BLD/T QL: NORMAL
LAB DIRECTOR NAME PROVIDER: NORMAL
LDLC SERPL CALC-MCNC: 70 MG/DL (ref 0–99)
LYMPHOCYTES # BLD AUTO: 2.2 X10E3/UL (ref 0.7–3.1)
LYMPHOCYTES NFR BLD AUTO: 26 %
Lab: NORMAL
MCH RBC QN AUTO: 30.4 PG (ref 26.6–33)
MCHC RBC AUTO-ENTMCNC: 33.3 G/DL (ref 31.5–35.7)
MCV RBC AUTO: 91 FL (ref 79–97)
MICROALBUMIN UR-MCNC: 18.4 UG/ML
MONOCYTES # BLD AUTO: 0.9 X10E3/UL (ref 0.1–0.9)
MONOCYTES NFR BLD AUTO: 10 %
NEUTROPHILS # BLD AUTO: 5 X10E3/UL (ref 1.4–7)
NEUTROPHILS NFR BLD AUTO: 60 %
PDF IMAGE, 910387: NORMAL
PLATELET # BLD AUTO: 243 X10E3/UL (ref 150–450)
POTASSIUM SERPL-SCNC: 4.7 MMOL/L (ref 3.5–5.2)
PROT SERPL-MCNC: 6.7 G/DL (ref 6–8.5)
PSA SERPL-MCNC: 0.5 NG/ML (ref 0–4)
RBC # BLD AUTO: 5.66 X10E6/UL (ref 4.14–5.8)
REFLEX CRITERIA: NORMAL
SODIUM SERPL-SCNC: 141 MMOL/L (ref 134–144)
TRIGL SERPL-MCNC: 115 MG/DL (ref 0–149)
VLDLC SERPL CALC-MCNC: 23 MG/DL (ref 5–40)
WBC # BLD AUTO: 8.5 X10E3/UL (ref 3.4–10.8)

## 2020-03-19 DIAGNOSIS — E78.5 DYSLIPIDEMIA: ICD-10-CM

## 2020-03-19 DIAGNOSIS — E11.9 TYPE 2 DIABETES MELLITUS WITH HEMOGLOBIN A1C GOAL OF LESS THAN 7.0% (HCC): ICD-10-CM

## 2020-03-19 RX ORDER — ATORVASTATIN CALCIUM 10 MG/1
TABLET, FILM COATED ORAL
Qty: 30 TAB | Refills: 4 | Status: SHIPPED | OUTPATIENT
Start: 2020-03-19 | End: 2020-08-19

## 2020-06-05 ENCOUNTER — HOSPITAL ENCOUNTER (EMERGENCY)
Age: 65
Discharge: HOME | End: 2020-06-05
Attending: EMERGENCY MEDICINE | Admitting: EMERGENCY MEDICINE
Payer: COMMERCIAL

## 2020-06-05 VITALS
OXYGEN SATURATION: 96 % | HEIGHT: 72 IN | BODY MASS INDEX: 28.22 KG/M2 | HEART RATE: 77 BPM | TEMPERATURE: 100.1 F | SYSTOLIC BLOOD PRESSURE: 152 MMHG | DIASTOLIC BLOOD PRESSURE: 88 MMHG | RESPIRATION RATE: 18 BRPM | WEIGHT: 208.34 LBS

## 2020-06-05 DIAGNOSIS — M10.9 ACUTE GOUT OF LEFT ANKLE, UNSPECIFIED CAUSE: Primary | ICD-10-CM

## 2020-06-05 PROCEDURE — 99284 EMERGENCY DEPT VISIT MOD MDM: CPT

## 2020-06-05 PROCEDURE — 74011250637 HC RX REV CODE- 250/637: Performed by: EMERGENCY MEDICINE

## 2020-06-05 RX ORDER — COLCHICINE 0.6 MG/1
0.6 TABLET ORAL DAILY
Qty: 1 TAB | Refills: 0 | Status: SHIPPED | OUTPATIENT
Start: 2020-06-05 | End: 2020-06-06

## 2020-06-05 RX ORDER — ACETAMINOPHEN 500 MG
1000 TABLET ORAL ONCE
Status: COMPLETED | OUTPATIENT
Start: 2020-06-05 | End: 2020-06-05

## 2020-06-05 RX ORDER — COLCHICINE 0.6 MG/1
0.6 TABLET ORAL
Status: COMPLETED | OUTPATIENT
Start: 2020-06-05 | End: 2020-06-05

## 2020-06-05 RX ADMIN — COLCHICINE 0.6 MG: 0.6 TABLET, FILM COATED ORAL at 20:01

## 2020-06-05 RX ADMIN — ACETAMINOPHEN 1000 MG: 500 TABLET ORAL at 20:00

## 2020-06-05 NOTE — ED PROVIDER NOTES
The history is provided by the patient. Ankle swelling    This is a new problem. The current episode started more than 2 days ago. The problem occurs rarely. The problem has been gradually worsening. The pain is present in the left ankle. The quality of the pain is described as aching. The pain is at a severity of 6/10. The pain is moderate. Associated symptoms include limited range of motion and stiffness. Pertinent negatives include no numbness, no tingling, no itching, no back pain and no neck pain. He has tried nothing for the symptoms. The treatment provided no relief. There has been no history of extremity trauma. Family history is significant for gout.         Past Medical History:   Diagnosis Date    Diabetes mellitus type 2, controlled ([facility] [address] 2015    Dyslipidemia 3/1/2018    Mild hypertension 6/1/2018    Osteoarthrosis of knee     saw Dr. Zain Khoury 2019       Past Surgical History:   Procedure Laterality Date    HX HERNIA REPAIR      R inguinal    HX WISDOM TEETH EXTRACTION           Family History:   Problem Relation Age of Onset    Diabetes Father     Hypertension Father     Cancer Mother         breast/lung    Hypertension Sister     Hypertension Brother     No Known Problems Daughter     No Known Problems Daughter     No Known Problems Son     Elevated Lipids Neg Hx        Social History     Socioeconomic History    Marital status:      Spouse name: Marquez Quan Number of children: 3    Years of education: Not on file    Highest education level: Not on file   Occupational History     Employer: [employer]   Social Needs    Financial resource strain: Not on file    Food insecurity     Worry: Not on file     Inability: Not on file   Workstir needs     Medical: Not on file     Non-medical: Not on file   Tobacco Use    Smoking status: Never Smoker    Smokeless tobacco: Current User     Types: Snuff    Tobacco comment: dip/everyday    Substance and Sexual Activity    Alcohol use: Not Currently     Alcohol/week: 0.0 standard drinks    Drug use: Never    Sexual activity: Yes     Partners: Female   Lifestyle    Physical activity     Days per week: Not on file     Minutes per session: Not on file    Stress: Not on file   Relationships    Social connections     Talks on phone: Not on file     Gets together: Not on file     Attends Worship service: Not on file     Active member of club or organization: Not on file     Attends meetings of clubs or organizations: Not on file     Relationship status: Not on file    Intimate partner violence     Fear of current or ex partner: Not on file     Emotionally abused: Not on file     Physically abused: Not on file     Forced sexual activity: Not on file   Other Topics Concern    Not on file   Social History Narrative    Not on file         ALLERGIES: Patient has no known allergies. Review of Systems   Constitutional: Negative for activity change, chills and fever. HENT: Negative for nosebleeds, sore throat, trouble swallowing and voice change. Eyes: Negative for visual disturbance. Respiratory: Negative for shortness of breath. Cardiovascular: Negative for chest pain and palpitations. Gastrointestinal: Negative for abdominal pain, constipation, diarrhea and nausea. Genitourinary: Negative for difficulty urinating, dysuria, hematuria and urgency. Musculoskeletal: Positive for gait problem, joint swelling and stiffness. Negative for back pain, neck pain and neck stiffness. Skin: Negative for color change and itching. Allergic/Immunologic: Negative for immunocompromised state. Neurological: Negative for dizziness, tingling, seizures, syncope, weakness, light-headedness, numbness and headaches. Psychiatric/Behavioral: Negative for behavioral problems, confusion, hallucinations, self-injury and suicidal ideas.        Vitals:    06/05/20 1942   BP: 152/88   Pulse: 77   Resp: 18   Temp: 100.1 °F (37.8 °C)   SpO2: 96% Weight: 94.5 kg (208 lb 5.4 oz)   Height: 6' (1.829 m)            Physical Exam  Vitals signs and nursing note reviewed. Constitutional:       General: He is not in acute distress. Appearance: He is well-developed. He is not diaphoretic. HENT:      Head: Atraumatic. Neck:      Trachea: No tracheal deviation. Cardiovascular:      Comments: Warm and well perfused  Pulmonary:      Effort: Pulmonary effort is normal. No respiratory distress. Musculoskeletal:      Left ankle: He exhibits decreased range of motion and swelling. He exhibits no deformity, no laceration and normal pulse. Skin:     General: Skin is warm and dry. Neurological:      Mental Status: He is alert. Coordination: Coordination normal.   Psychiatric:         Behavior: Behavior normal.         Thought Content: Thought content normal.         Judgment: Judgment normal.          MDM     This is a 71-year-old male with past medical history, review of systems, physical exam as above, presenting with gradual onset left ankle pain, swelling, erythema, in the setting of a previous history of gouty arthritis. Patient states symptoms began to develop 4 days ago, denies trauma, states no relief from over-the-counter medications, rest, ice, elevation. Patient denies a history of interventions in the left ankle. He denies other symptoms at this time. Physical exam is remarkable for well-appearing elderly male, in no acute distress, noted to be afebrile, normotensive, without tachycardia. Left ankle with mild erythema, mild swelling, exquisitely tender to palpation. Suspect gouty arthritis, given no report of trauma will defer plain films, provide Tylenol and colchicine as well as discharge with second dose of colchicine. Recommend to follow with primary care if symptoms do not resolve in several days. Return precautions given.     Procedures

## 2020-06-05 NOTE — DISCHARGE INSTRUCTIONS
Patient Education        Gout: Care Instructions  Your Care Instructions     Gout is a form of arthritis caused by a buildup of uric acid crystals in a joint. It causes sudden attacks of pain, swelling, redness, and stiffness, usually in one joint, especially the big toe. Gout usually comes on without a cause. But it can be brought on by drinking alcohol (especially beer) or eating seafood and red meat. Taking certain medicines, such as diuretics or aspirin, also can bring on an attack of gout. Taking your medicines as prescribed and following up with your doctor regularly can help you avoid gout attacks in the future. Follow-up care is a key part of your treatment and safety. Be sure to make and go to all appointments, and call your doctor if you are having problems. It's also a good idea to know your test results and keep a list of the medicines you take. How can you care for yourself at home? · If the joint is swollen, put ice or a cold pack on the area for 10 to 20 minutes at a time. Put a thin cloth between the ice and your skin. · Prop up the sore limb on a pillow when you ice it or anytime you sit or lie down during the next 3 days. Try to keep it above the level of your heart. This will help reduce swelling. · Rest sore joints. Avoid activities that put weight or strain on the joints for a few days. Take short rest breaks from your regular activities during the day. · Take your medicines exactly as prescribed. Call your doctor if you think you are having a problem with your medicine. · Take pain medicines exactly as directed. ? If the doctor gave you a prescription medicine for pain, take it as prescribed. ? If you are not taking a prescription pain medicine, ask your doctor if you can take an over-the-counter medicine. · Eat less seafood and red meat. · Check with your doctor before drinking alcohol. · Losing weight, if you are overweight, may help reduce attacks of gout.  But do not go on a \"crash diet. \" Losing a lot of weight in a short amount of time can cause a gout attack. When should you call for help? Call your doctor now or seek immediate medical care if:  · You have a fever. · The joint is so painful you cannot use it. · You have sudden, unexplained swelling, redness, warmth, or severe pain in one or more joints. Watch closely for changes in your health, and be sure to contact your doctor if:  · You have joint pain. · Your symptoms get worse or are not improving after 2 or 3 days. Where can you learn more? Go to http://elizabeth-luke.info/  Enter E531 in the search box to learn more about \"Gout: Care Instructions. \"  Current as of: December 9, 2019               Content Version: 12.5  © 6802-6260 Healthwise[employer]. Care instructions adapted under license by [employer] (which disclaims liability or warranty for this information). If you have questions about a medical condition or this instruction, always ask your healthcare professional. [address] any warranty or liability for your use of this information.

## 2020-06-06 RX ORDER — COLCHICINE 0.6 MG/1
TABLET ORAL
Qty: 20 TAB | Refills: 0 | Status: SHIPPED | OUTPATIENT
Start: 2020-06-06 | End: 2021-02-16 | Stop reason: ALTCHOICE

## 2020-06-06 NOTE — ED NOTES
Patient provided with discharge instructions and verbalized understanding. Patient assisted to car via wheelchair by RN.

## 2020-06-06 NOTE — ED NOTES
Called pt.  Back and informed him that additional medication was sent to pharmacy and stressed importance to follow up with PCP on Monday

## 2020-10-08 DIAGNOSIS — E11.9 DIABETES MELLITUS TYPE 2, INSULIN DEPENDENT (HCC): ICD-10-CM

## 2020-10-08 DIAGNOSIS — Z79.4 DIABETES MELLITUS TYPE 2, INSULIN DEPENDENT (HCC): ICD-10-CM

## 2020-10-08 RX ORDER — PEN NEEDLE, DIABETIC 31 GX3/16"
NEEDLE, DISPOSABLE MISCELLANEOUS
Qty: 100 PEN NEEDLE | Refills: 5 | Status: SHIPPED | OUTPATIENT
Start: 2020-10-08 | End: 2022-07-06 | Stop reason: SDUPTHER

## 2020-10-08 RX ORDER — INSULIN GLARGINE 100 [IU]/ML
INJECTION, SOLUTION SUBCUTANEOUS
Qty: 15 ADJUSTABLE DOSE PRE-FILLED PEN SYRINGE | Refills: 5 | Status: SHIPPED | OUTPATIENT
Start: 2020-10-08 | End: 2021-12-12

## 2020-11-19 ENCOUNTER — PATIENT MESSAGE (OUTPATIENT)
Dept: INTERNAL MEDICINE CLINIC | Age: 65
End: 2020-11-19

## 2021-01-27 ENCOUNTER — OFFICE VISIT (OUTPATIENT)
Dept: URGENT CARE | Age: 66
End: 2021-01-27
Payer: COMMERCIAL

## 2021-01-27 VITALS — RESPIRATION RATE: 18 BRPM | HEART RATE: 61 BPM | OXYGEN SATURATION: 96 % | TEMPERATURE: 99.3 F

## 2021-01-27 DIAGNOSIS — R68.89 FLU-LIKE SYMPTOMS: Primary | ICD-10-CM

## 2021-01-27 LAB
FLUAV+FLUBV AG NOSE QL IA.RAPID: NEGATIVE
FLUAV+FLUBV AG NOSE QL IA.RAPID: NEGATIVE
VALID INTERNAL CONTROL?: YES

## 2021-01-27 PROCEDURE — 99213 OFFICE O/P EST LOW 20 MIN: CPT | Performed by: NURSE PRACTITIONER

## 2021-01-27 PROCEDURE — 87804 INFLUENZA ASSAY W/OPTIC: CPT | Performed by: NURSE PRACTITIONER

## 2021-01-27 NOTE — PROGRESS NOTES
Subjective: (As above and below)       This patient was seen in [facility] at [address] [place] [address] Urgent Care while outdoors at their vehicle due to COVID-19 pandemic with PPE and focused examination in order to decrease community viral transmission. The patient/guardian gave verbal consent to treat. Chief Complaint   Patient presents with    Concern For COVID-19 (Coronavirus)     Pt denies exposure to covid. Pt reports having a cough, chills, and a headache for 2-3 days. Zahraa Melgar is a 60 y.o. male who presents for evaluation of : dry cough, mild headache, nasal congestion, body aches and subjective fever. Symptom rapid onset 3 days ago . Preceding illness: none. No other identified aggravating or alleviating factors. Symptoms are constant and overall not resolved. Promotes no decrease in PO intake of fluids. Denies: severe lethargy, SOB, syncope/near syncope, vomiting/diarrhea, chest pain, chest pain with breathing, labored breathing, severe headache, non-intractable fevers . Recent travel: no  Known Exposure to COVID-19: no known  Known flu or strep contact: no   His wife accompanies him today and she has similar symptoms. Hx of diabetes  Review of Systems - negative except as listed above    Reviewed PmHx, RxHx, FmHx, SocHx, AllgHx and updated in chart.   Family History   Problem Relation Age of Onset    Diabetes Father     Hypertension Father     Cancer Mother         breast/lung    Hypertension Sister     Hypertension Brother     No Known Problems Daughter     No Known Problems Daughter     No Known Problems Son     Elevated Lipids Neg Hx        Past Medical History:   Diagnosis Date    Diabetes mellitus type 2, controlled ([facility] [address] 2015    Dyslipidemia 3/1/2018    Mild hypertension 6/1/2018    Osteoarthrosis of knee     saw Dr. Maria Esther Foley 2019      Social History     Socioeconomic History    Marital status:      Spouse name: Binu Rodríguez Number of children: 3    Years of education: Not on file    Highest education level: Not on file   Occupational History     Employer: [employer]   Tobacco Use    Smoking status: Never Smoker    Smokeless tobacco: Current User     Types: Snuff    Tobacco comment: dip/everyday    Substance and Sexual Activity    Alcohol use: Not Currently     Alcohol/week: 0.0 standard drinks    Drug use: Never    Sexual activity: Yes     Partners: Female          Current Outpatient Medications   Medication Sig    Insulin Needles, Disposable, (Jade Pen Needle) 32 gauge x 5/32\" ndle USE ONCE DAILY AS DIRECTED WITH INSULIN    Basaglar KwikPen U-100 Insulin 100 unit/mL (3 mL) inpn INJECT 25 UNITS UNDER THE SKIN DAILY    atorvastatin (LIPITOR) 10 mg tablet TAKE ONE TABLET BY MOUTH DAILY    empagliflozin (Jardiance) 25 mg tablet TAKE ONE TABLET BY MOUTH DAILY    colchicine 0.6 mg tablet Take 2 tablets for first dose, then take 1 tablet 1 hour later for second dose. Afterward take 1 tablet twice daily until flare resolution.  glucose blood VI test strips (ACCU-CHEK SMARTVIEW TEST STRIP) strip Test Fasting Blood Sugar Once Daily. Dx E11.9    lancets (ACCU-CHEK FASTCLIX LANCING DEV) misc Test Fasting Blood Sugar Once Daily    Blood-Glucose Meter monitoring kit 1 meter    aspirin delayed-release 81 mg tablet Take 1 Tab by mouth daily. No current facility-administered medications for this visit. Objective:     Vitals:    01/27/21 1035   Pulse: 61   Resp: 18   Temp: 99.3 °F (37.4 °C)   SpO2: 96%       Physical Exam  General appearance - appears well hydrated and does not appear toxic, no acute distress  Eyes - EOMs intact. Non injected. No scleral icterus   Ears - no external swelling  Nose - nasal congestion. No purulent drainage  Mouth - OP clear without swelling, exudate or lesion. Mucus membranes moist. Uvula midline.   Neck/Lymphatics - trachea midline, full AROM  Chest - Normal breathing effort no wheeze rales, rhonchi or diminishments bilaterally. Heart - RRR, no murmurs  Skin - no observable rashes or pallor  Neurologic- alert and oriented x 3  Psychiatric- normal mood, behavior and though content. Assessment/ Plan:     1. Flu-like symptoms    - NOVEL CORONAVIRUS (COVID-19)  - AMB POC PATRICIA INFLUENZA A/B TEST    Rapid flu test negative. Suspect viral etiology. Lungs clear, VS stable, breathing normally; no evidence suggesting pneumonia at this point in time. No evidence suggesting complication of illness at this time. Will discharge home with close monitoring and follow up. Supportive home care for mild symptoms advised- maintain adequate fluid intake, lozenges, over the counter Tylenol (for fever, aches, pains, chills), deep breathing exercises  Recommendation for self isolation/quarantine based on current CDC guidelines      Follow up: We have reviewed worsening/concerning signs and symptoms that may warrant seeking immediate care in the ED setting. For other non-severe changes, non-improvement or questions, patient aware to contact PCP office or consider a virtual online medical consultation.         Rosa Silverio NP

## 2021-01-27 NOTE — LETTER
NOTIFICATION RETURN TO WORK / SCHOOL 
 
1/27/2021 11:18 AM 
 
Mr. Chelsea Sanchez [id number] [address] To Whom It May Concern: 
 
Chelsea Sanchez is currently under the care of [facility]. Please allow to quarantine/self isolate until: 02/03/2021 If there are questions or concerns please have the patient contact our office. Sincerely, GHE PROVIDER

## 2021-01-28 ENCOUNTER — TELEPHONE (OUTPATIENT)
Dept: INTERNAL MEDICINE CLINIC | Age: 66
End: 2021-01-28

## 2021-01-28 NOTE — TELEPHONE ENCOUNTER
Patient has CPE to be completed on 2/16/21. Patient and wife requesting to have labs drawn with [facility] and not with our office. I advised that all labs are ordered at the time of visit. Patient wife stated our office lab is not within [facility] network and would like to have all labs to be drawn at the [facility] on the third floor in our building. Patient wife was thankful for the call back.

## 2021-01-28 NOTE — TELEPHONE ENCOUNTER
----- Message from Jose F Murrell sent at 1/28/2021 10:54 AM EST -----  Regarding: Dr. Gaurang Penn: [phone]  General Message/Vendor Calls    Caller's first and last name: Sarina Jiménez, Wife. Reason for call: Needs lab orders mailed. Callback required yes/no and why: Yes, or can be confirmed on [employer]. Best contact number(s): [phone]      Details to clarify the request: Would like lab orders mailed so pt may go to [facility] before appointment on 2/16/2021.       Jose F Murrell

## 2021-01-29 ENCOUNTER — TELEPHONE (OUTPATIENT)
Dept: INTERNAL MEDICINE CLINIC | Age: 66
End: 2021-01-29

## 2021-01-29 LAB — SARS-COV-2, NAA: DETECTED

## 2021-01-29 NOTE — TELEPHONE ENCOUNTER
Returned call to pt. He states his wife was showing covid symptoms last week and he and his wife were both tested, positive results back back today. Pt reports mild cough, and muscle aches. Pt has been advised to monitor symptoms and advise of any acute changes.

## 2021-01-29 NOTE — PROGRESS NOTES
was verified and then the positive results were given. Aching,occasional \"tickle\" cough, mild decreased appetite. No SOB, CP or weakness. Due to being diabetic, it was recommended he talk with his PCP today about possible further treatment. The patient was instructed to stay in quarantine for 14 days from the onset of symptoms and was told to not return to work or go out in public until 14 days of quarantine is completed and they have 1 days of no sx without symptom treatment or fever reducers. They were encouraged to stay hydrated, continue to ambulate and to practice deep breathing exercises. They are to go into the Emergency Department for any shortness of breath, trouble breathing or weakness or any concerning new or progressive symptoms. Have been advised to review quarantine/self isolation CDC guidelines and to notify any individuals they have been around. We reviewed appropriate home care as well as symptoms that may warrant immediate/emergent evaluation. Patient may otherwise contact PCP for any minor changes or concerns.    Patient had no further questions or concerns at this time

## 2021-01-29 NOTE — TELEPHONE ENCOUNTER
Pt has tested positive for covid and his wife.  They got results today and was advised to call you for further information

## 2021-02-16 ENCOUNTER — OFFICE VISIT (OUTPATIENT)
Dept: INTERNAL MEDICINE CLINIC | Age: 66
End: 2021-02-16
Payer: COMMERCIAL

## 2021-02-16 VITALS
WEIGHT: 200.8 LBS | DIASTOLIC BLOOD PRESSURE: 77 MMHG | OXYGEN SATURATION: 96 % | TEMPERATURE: 98.5 F | RESPIRATION RATE: 14 BRPM | BODY MASS INDEX: 27.2 KG/M2 | SYSTOLIC BLOOD PRESSURE: 130 MMHG | HEART RATE: 83 BPM | HEIGHT: 72 IN

## 2021-02-16 DIAGNOSIS — U07.1 COVID-19: ICD-10-CM

## 2021-02-16 DIAGNOSIS — R05.9 COUGH: ICD-10-CM

## 2021-02-16 DIAGNOSIS — M1A.9XX0 CHRONIC GOUT WITHOUT TOPHUS, UNSPECIFIED CAUSE, UNSPECIFIED SITE: ICD-10-CM

## 2021-02-16 DIAGNOSIS — Z79.4 DIABETES MELLITUS TYPE 2, INSULIN DEPENDENT (HCC): ICD-10-CM

## 2021-02-16 DIAGNOSIS — Z12.5 PROSTATE CANCER SCREENING: ICD-10-CM

## 2021-02-16 DIAGNOSIS — Z00.00 PREVENTATIVE HEALTH CARE: Primary | ICD-10-CM

## 2021-02-16 DIAGNOSIS — E11.9 DIABETES MELLITUS TYPE 2, INSULIN DEPENDENT (HCC): ICD-10-CM

## 2021-02-16 DIAGNOSIS — E78.5 DYSLIPIDEMIA: ICD-10-CM

## 2021-02-16 PROCEDURE — 99396 PREV VISIT EST AGE 40-64: CPT | Performed by: INTERNAL MEDICINE

## 2021-02-16 PROCEDURE — 99214 OFFICE O/P EST MOD 30 MIN: CPT | Performed by: INTERNAL MEDICINE

## 2021-02-16 RX ORDER — ZOSTER VACCINE RECOMBINANT, ADJUVANTED 50 MCG/0.5
0.5 KIT INTRAMUSCULAR ONCE
Qty: 0.5 ML | Refills: 1 | Status: SHIPPED | OUTPATIENT
Start: 2021-02-16 | End: 2021-02-16

## 2021-02-16 NOTE — PROGRESS NOTES
Ngoc Chand is a 60 y.o. male  Presenting for his annual checkup and health maintenance review and follow-up   Last seen January 2020. Diagnosed with COVID-19 on January 27 after he had 3 days of myalgias, mild fever, dry cough. Family was also sick. Says that he is feeling much better over the past week. Has occasional mild spastic cough but denies any shortness of breath. No fever. Gout follow-up  Was diagnosed with a flat flare of his ankle this summer when he had a a lot of oysters. Has had no recurrence. Diabetes Mellitus follow-up  Last hemoglobin a1c   Lab Results   Component Value Date/Time    Hemoglobin A1c 6.4 (H) 01/15/2020 08:13 AM    Hemoglobin A1c (POC) 5.9 07/08/2019 04:20 AM   medication compliance: compliant all of the time  Taking jardiance since 12/2018 and on lantus 15 u. Diabetic diet compliance: compliant most of the time. Home glucose monitoring: fasting values range NONE. Hypoglycemic episodes:  None. Further diabetic ROS: no polyuria or polydipsia, no chest pain, dyspnea or TIA's, no numbness, tingling or pain in extremities, no unusual visual symptoms. Hyperlipidemia  Currently he takes lipitor 10 mg  ROS: taking medications as instructed, no medication side effects noted  No new myalgias, no joint pains, no weakness  No TIA's, no chest pain on exertion, no dyspnea on exertion, no swelling of ankles.    Lab Results   Component Value Date/Time    Cholesterol, total 137 01/15/2020 08:13 AM    HDL Cholesterol 44 01/15/2020 08:13 AM    LDL, calculated 70 01/15/2020 08:13 AM    VLDL, calculated 23 01/15/2020 08:13 AM    Triglyceride 115 01/15/2020 08:13 AM     Exercise: moderately active  Diet: generally follows a low fat low cholesterol diet  Health Maintenance   Topic Date Due    Eye Exam Retinal or Dilated  06/11/1966    COVID-19 Vaccine (1 of 2) 06/11/1972    Colonoscopy  06/11/1974    Shingrix Vaccine Age 50> (1 of 2) 06/11/2006    A1C test (Diabetic or Prediabetic)  01/15/2021    MICROALBUMIN Q1  01/15/2021    Lipid Screen  01/15/2021    Foot Exam Q1  02/16/2022    DTaP/Tdap/Td series (3 - Td) 12/12/2028    Hepatitis C Screening  Completed    Flu Vaccine  Completed    Pneumococcal 0-64 years  Completed      Health Maintenance reviewed  Last digital rectal exam:  none  Lab Results   Component Value Date/Time    Prostate Specific Ag 0.5 01/15/2020 08:13 AM    Prostate Specific Ag 0.5 01/08/2019 10:04 AM    Prostate Specific Ag 0.6 02/03/2018 08:36 AM       Vaccinations reviewed  Immunization History   Administered Date(s) Administered    Influenza Vaccine 10/01/2018, 10/20/2020    Influenza Vaccine (Quad) PF (>6 Mo Flulaval, Fluarix, and >3 Yrs Afluria, Fluzone [id number]) 11/13/2018, 01/10/2020    Pneumococcal Polysaccharide (PPSV-23) 01/10/2020    Tdap 11/13/2018, 12/12/2018       Past Medical History:   Diagnosis Date    COVID-19     1/28/21    Diabetes mellitus type 2, controlled ([facility] [address] 2015    Dyslipidemia 3/1/2018    Gout     Mild hypertension 6/1/2018    Osteoarthrosis of knee     saw Dr. Bailey Layne 2019      has a past surgical history that includes hx hernia repair; hx wisdom teeth extraction; and hx colonoscopy (02/18/2020). Patient has no known allergies. Current Outpatient Medications   Medication Sig    Shingrix, PF, 50 mcg/0.5 mL susr injection 0.5 mL by IntraMUSCular route once for 1 dose. Receive 2nd dose in 2-6 months. For Shingles (Zoster) prevention    atorvastatin (LIPITOR) 10 mg tablet TAKE ONE TABLET BY MOUTH DAILY    Insulin Needles, Disposable, (Jade Pen Needle) 32 gauge x 5/32\" ndle USE ONCE DAILY AS DIRECTED WITH INSULIN    Basaglar KwikPen U-100 Insulin 100 unit/mL (3 mL) inpn INJECT 25 UNITS UNDER THE SKIN DAILY    empagliflozin (Jardiance) 25 mg tablet TAKE ONE TABLET BY MOUTH DAILY    glucose blood VI test strips (ACCU-CHEK SMARTVIEW TEST STRIP) strip Test Fasting Blood Sugar Once Daily.  Dx E11.9    lancets (ACCU-CHEK FASTCLIX LANCING DEV) misc Test Fasting Blood Sugar Once Daily    Blood-Glucose Meter monitoring kit 1 meter    aspirin delayed-release 81 mg tablet Take 1 Tab by mouth daily. No current facility-administered medications for this visit. SOCIAL HX:  reports that he has never smoked. His smokeless tobacco use includes snuff. He reports previous alcohol use. He reports that he does not use drugs. FAMILY HX: family history includes Cancer in his mother; Diabetes in his father; Hypertension in his brother, father, and sister; No Known Problems in his daughter, daughter, and son. Review of Systems - History obtained from the patient  General ROS: negative for - night sweats, weight gain or weight loss  Cardiovascular ROS: no chest pain, dyspnea on exertion, edema    Physical exam  Blood pressure (!) 146/77, pulse 83, temperature 98.5 °F (36.9 °C), temperature source Oral, resp. rate 14, height 6' (1.829 m), weight 200 lb 12.8 oz (91.1 kg), SpO2 96 %. Wt Readings from Last 3 Encounters:   02/16/21 200 lb 12.8 oz (91.1 kg)   06/05/20 208 lb 5.4 oz (94.5 kg)   01/10/20 209 lb (94.8 kg)     He appears well, alert and oriented x 3, pleasant and cooperative. Vitals as noted. No rashes or significant lesions. Neck supple and free of adenopathy, or masses. No thyromegaly or carotid bruits. Cranial nerves normal. Lungs are clear to auscultation. Heart sounds are normal with no murmurs, clicks, gallops or rubs. Abdomen is soft, non- tender, with no masses or organomegaly. Extremities, peripheral pulses and reflexes are normal.  . RECTAL/PROSTATE EXAM: Deferred skin is without rashes or suspicious lesions. Foot exam  - skin intact, mild dryness w no fissures, no rashes, no significant ulcers or callous formation. Sensation intact by microfilament or light touch    Diagnoses and all orders for this visit:    1. Preventative health care  Colonoscopy in chart from February 2020.   Repeat in 5 years, February 2025  Recommend Shingrix vaccine. Recommend COVID-19 vaccine in 90+ day since just had Covid recently. -     CBC W/O DIFF; Future    2. Prostate cancer screening  -     PSA W/ REFLX FREE PSA; Future    3. Diabetes mellitus type 2, insulin dependent ([facility] [address]  This condition is controlled on current medication regimen as written in medication list.  Medications refilled. Recommend eye exam  -     METABOLIC PANEL, COMPREHENSIVE; Future  -     HEMOGLOBIN A1C WITH EAG; Future  -     MICROALBUMIN, UR, RAND W/ MICROALB/CREAT RATIO; Future  -     REFERRAL TO OPHTHALMOLOGY    4. Dyslipidemia  This condition is controlled on current medication regimen as written in medication list.  Medications refilled. -     LIPID PANEL; Future    5. Chronic gout without tophus, unspecified cause, unspecified site  Asymptomatic. Likely can be dietary controlled. We will get baseline uric acid. -     URIC ACID; Future    6. Cough  7. COVID-19  Mild ongoing spastic cough. Otherwise doing well.  20 days from onset of Covid symptoms, no longer contagious. Offered albuterol but he declined. Lungs are clear. Oxygen normal.      Recommend Shingrix vaccine  -     Shingrix, PF, 50 mcg/0.5 mL susr injection; 0.5 mL by IntraMUSCular route once for 1 dose. Receive 2nd dose in 2-6 months. For Shingles (Zoster) prevention                The patient is asked to make an attempt to improve diet and exercise patterns  Avoid tobacco products, excessive alcohol    Return for yearly wellness visits        Discussed the patient's BMI with him. The BMI follow up plan is as follows:     dietary management education, guidance, and counseling  encourage exercise  monitor weight  prescribed dietary intake    An After Visit Summary was printed and given to the patient.

## 2021-02-19 LAB
ALBUMIN SERPL-MCNC: 4.5 G/DL (ref 3.8–4.8)
ALBUMIN/CREAT UR: 16 MG/G CREAT (ref 0–29)
ALBUMIN/GLOB SERPL: 1.8 {RATIO} (ref 1.2–2.2)
ALP SERPL-CCNC: 83 IU/L (ref 39–117)
ALT SERPL-CCNC: 19 IU/L (ref 0–44)
AST SERPL-CCNC: 18 IU/L (ref 0–40)
BILIRUB SERPL-MCNC: 1.1 MG/DL (ref 0–1.2)
BUN SERPL-MCNC: 20 MG/DL (ref 8–27)
BUN/CREAT SERPL: 15 (ref 10–24)
CALCIUM SERPL-MCNC: 10 MG/DL (ref 8.6–10.2)
CHLORIDE SERPL-SCNC: 104 MMOL/L (ref 96–106)
CHOLEST SERPL-MCNC: 158 MG/DL (ref 100–199)
CO2 SERPL-SCNC: 24 MMOL/L (ref 20–29)
CREAT SERPL-MCNC: 1.35 MG/DL (ref 0.76–1.27)
CREAT UR-MCNC: 134.1 MG/DL
ERYTHROCYTE [DISTWIDTH] IN BLOOD BY AUTOMATED COUNT: 12.5 % (ref 11.6–15.4)
EST. AVERAGE GLUCOSE BLD GHB EST-MCNC: 148 MG/DL
GLOBULIN SER CALC-MCNC: 2.5 G/DL (ref 1.5–4.5)
GLUCOSE SERPL-MCNC: 121 MG/DL (ref 65–99)
HBA1C MFR BLD: 6.8 % (ref 4.8–5.6)
HCT VFR BLD AUTO: 54.1 % (ref 37.5–51)
HDLC SERPL-MCNC: 44 MG/DL
HGB BLD-MCNC: 18.5 G/DL (ref 13–17.7)
INTERPRETATION, 910389: NORMAL
INTERPRETATION: NORMAL
LDLC SERPL CALC-MCNC: 89 MG/DL (ref 0–99)
Lab: NORMAL
MCH RBC QN AUTO: 30.3 PG (ref 26.6–33)
MCHC RBC AUTO-ENTMCNC: 34.2 G/DL (ref 31.5–35.7)
MCV RBC AUTO: 89 FL (ref 79–97)
MICROALBUMIN UR-MCNC: 20.9 UG/ML
PDF IMAGE, 910387: NORMAL
PLATELET # BLD AUTO: 278 X10E3/UL (ref 150–450)
POTASSIUM SERPL-SCNC: 4.8 MMOL/L (ref 3.5–5.2)
PROT SERPL-MCNC: 7 G/DL (ref 6–8.5)
PSA SERPL-MCNC: 0.6 NG/ML (ref 0–4)
RBC # BLD AUTO: 6.1 X10E6/UL (ref 4.14–5.8)
REFLEX CRITERIA: NORMAL
SODIUM SERPL-SCNC: 142 MMOL/L (ref 134–144)
TRIGL SERPL-MCNC: 142 MG/DL (ref 0–149)
URATE SERPL-MCNC: 6.4 MG/DL (ref 3.8–8.4)
VLDLC SERPL CALC-MCNC: 25 MG/DL (ref 5–40)
WBC # BLD AUTO: 8.9 X10E3/UL (ref 3.4–10.8)

## 2021-02-21 PROBLEM — D75.1 ERYTHROCYTOSIS: Status: ACTIVE | Noted: 2019-01-01

## 2021-04-26 ENCOUNTER — NURSE TRIAGE (OUTPATIENT)
Dept: OTHER | Facility: CLINIC | Age: 66
End: 2021-04-26

## 2021-04-26 NOTE — TELEPHONE ENCOUNTER
Patient called Susan with red flag complaint. Call received from pt's wife, [facility]. Brief description of triage:   Pt's wife states that pt has had an ongoing cough and fatigue since he had Covid 19. She states that he had Covid 19 in January 2021. Pt did get the 1st Pfizer Covid 19 vaccine on 3/31/2021. Triage indicates:  Pt is currently not with the caller, therefore RN is unable to triage. Care advice provided, patient verbalizes understanding; denies any other questions or concerns; instructed to call back for any new or worsening symptoms. Writer provided warm transfer to Sharla at [employer] [facility] for appointment scheduling. Attention Provider: Thank you for allowing me to participate in the care of your patient. The patient was connected to triage from [employer] [facility]. Please do not respond through this encounter as the response is not directed to a shared pool.     Reason for Disposition   [1] Caller is not with the adult (patient) AND [2] probable NON-URGENT symptoms    Protocols used: INFORMATION ONLY CALL - NO TRIAGE[facility]

## 2021-05-04 ENCOUNTER — HOSPITAL ENCOUNTER (OUTPATIENT)
Dept: GENERAL RADIOLOGY | Age: 66
Discharge: HOME | End: 2021-05-04
Attending: INTERNAL MEDICINE
Payer: COMMERCIAL

## 2021-05-04 ENCOUNTER — OFFICE VISIT (OUTPATIENT)
Dept: INTERNAL MEDICINE CLINIC | Age: 66
End: 2021-05-04
Attending: INTERNAL MEDICINE
Payer: COMMERCIAL

## 2021-05-04 ENCOUNTER — TELEPHONE (OUTPATIENT)
Dept: INTERNAL MEDICINE CLINIC | Age: 66
End: 2021-05-04

## 2021-05-04 VITALS
BODY MASS INDEX: 26.98 KG/M2 | TEMPERATURE: 98.6 F | WEIGHT: 199.2 LBS | DIASTOLIC BLOOD PRESSURE: 86 MMHG | HEART RATE: 82 BPM | HEIGHT: 72 IN | SYSTOLIC BLOOD PRESSURE: 130 MMHG | OXYGEN SATURATION: 95 % | RESPIRATION RATE: 14 BRPM

## 2021-05-04 DIAGNOSIS — J30.1 SEASONAL ALLERGIC RHINITIS DUE TO POLLEN: ICD-10-CM

## 2021-05-04 DIAGNOSIS — R05.9 COUGH: Primary | ICD-10-CM

## 2021-05-04 DIAGNOSIS — E11.9 CONTROLLED TYPE 2 DIABETES MELLITUS WITHOUT COMPLICATION, WITHOUT LONG-TERM CURRENT USE OF INSULIN (HCC): ICD-10-CM

## 2021-05-04 DIAGNOSIS — R05.9 COUGH: ICD-10-CM

## 2021-05-04 DIAGNOSIS — Z86.16 HISTORY OF COVID-19: ICD-10-CM

## 2021-05-04 DIAGNOSIS — G47.30 SLEEP APNEA, UNSPECIFIED TYPE: ICD-10-CM

## 2021-05-04 PROBLEM — U07.1 COVID-19: Status: ACTIVE | Noted: 2021-01-28

## 2021-05-04 PROCEDURE — 71046 X-RAY EXAM CHEST 2 VIEWS: CPT

## 2021-05-04 PROCEDURE — 99214 OFFICE O/P EST MOD 30 MIN: CPT | Performed by: INTERNAL MEDICINE

## 2021-05-04 RX ORDER — FLUTICASONE PROPIONATE 50 MCG
2 SPRAY, SUSPENSION (ML) NASAL DAILY
Qty: 1 BOTTLE | Refills: 5
Start: 2021-05-04 | End: 2022-05-05 | Stop reason: ALTCHOICE

## 2021-05-04 NOTE — PROGRESS NOTES
HISTORY OF PRESENT ILLNESS    Chief Complaint   Patient presents with    Cough     Had COVID in January 2021 - has lingering cough.  Testing     Requesting sleep study per wife. Presents for follow-up    He is retiring from [employer] in June. Reports cough for the past 4 months. It is mild and intermittent. Denies any shortness of breath, hemoptysis or wheezing. He was diagnosed with COVID-19 and had moderate symptoms in January. Symptoms improved after couple of weeks. He does not smoke cigarettes, but uses oral tobacco products. His wife has a family history of lung cancer and she is very worried about his cough and asked him to come in. He states that he has allergic postnasal drip. Symptoms are moderate. Postnasal drip with clear nasal discharge. Denies any sinus pressure, fever or chills. Denies any sore throat. Denies dysphagia. Sleep apnea. Wife says that he snores heavily and stops breathing for several seconds at a time. He gasps for air after these episodes. Symptoms seem to be worsening. He says he feels moderate fatigue when he wakes up in the morning. Does not fall asleep unprovoked. His wife would like him tested for sleep apnea. He does have a history of borderline hypertension, dyslipidemia. Diabetes Mellitus follow-up  Last hemoglobin a1c   Lab Results   Component Value Date/Time    Hemoglobin A1c 6.8 (H) 02/16/2021 10:21 AM    Hemoglobin A1c (POC) 5.9 07/08/2019 04:20 AM   medication compliance: compliant all of the time. Diabetic diet compliance: compliant most of the time. Home glucose monitoring: fasting values range DECLINES. Hypoglycemic episodes:  None. Further diabetic ROS: no polyuria or polydipsia, no chest pain, dyspnea or TIA's, no numbness, tingling or pain in extremities.        Review of Systems   All other systems reviewed and are negative, except as noted in HPI    Past Medical and Surgical History   has a past medical history of COVID-19 (01/28/2021), Diabetes mellitus type 2, controlled ([facility] [address] (2015), Dyslipidemia (3/1/2018), Erythrocytosis (2019), Gout (06/2020), Mild hypertension (6/1/2018), and Osteoarthrosis of knee. has a past surgical history that includes hx hernia repair; hx wisdom teeth extraction; and hx colonoscopy (02/18/2020). reports that he has never smoked. His smokeless tobacco use includes snuff. He reports previous alcohol use. He reports that he does not use drugs. family history includes Cancer in his mother; Diabetes in his father; Hypertension in his brother, father, and sister; No Known Problems in his daughter, daughter, and son. Physical Exam   Nursing note and vitals reviewed. Blood pressure 130/86, pulse 82, temperature 98.6 °F (37 °C), temperature source Oral, resp. rate 14, height 6' (1.829 m), weight 199 lb 3.2 oz (90.4 kg), SpO2 95 %. Constitutional:  No distress. Eyes: Conjunctivae are normal.   Ears:  Hearing grossly intact  Cardiovascular: Normal rate. regular rhythm, no murmurs or gallops  No edema  Pulmonary/Chest: Effort normal.   CTAB  Musculoskeletal: moves all 4 extremities   Neurological: Alert and oriented to person, place, and time. Skin: No visible rash noted. Psychiatric: Normal mood and affect. Behavior is normal.     ASSESSMENT and PLAN  Diagnoses and all orders for this visit:    1. Cough  Persistent cough status post COVID-19 3 months ago. Allergic rhinitis contributing. Chest x-ray today is normal.  Treat allergies and give it a little more time. This is likely a postviral cough. However, consider chest CT scan and ENT eval (oral tobacco use) if persists over the next couple of months. -     XR CHEST PA LAT; Future  -     REFERRAL TO PULMONARY DISEASE    2. History of COVID-19    3. Seasonal allergic rhinitis due to pollen  Sinus congestion from allergic rhinitis may be contributing to his cough and sleep apnea symptoms. Recommend nasal saline and Flonase. Could consider an antihistamine.  -     fluticasone propionate (FLONASE) 50 mcg/actuation nasal spray; 2 Sprays by Both Nostrils route daily. 4. Sleep apnea, unspecified type  Witnessed apnea, moderate fatigue, snoring. He certainly has some degree of sleep apnea. Will refer to pulmonary since he prefers to consult the doctor prior to doing a sleep study and there may be some issues with convincing him to use CPAP if he is diagnosed. -     REFERRAL TO PULMONARY DISEASE    5. Controlled type 2 diabetes mellitus without complication, without long-term current use of insulin (HCC)  Controlled. Repeat labs in 3 months. lab results and schedule of future lab studies reviewed with patient  reviewed medications and side effects in detail    Return to clinic for further evaluation if new symptoms develop        Current Outpatient Medications   Medication Sig    empagliflozin (Jardiance) 25 mg tablet TAKE ONE TABLET BY MOUTH DAILY    atorvastatin (LIPITOR) 10 mg tablet TAKE ONE TABLET BY MOUTH DAILY    Insulin Needles, Disposable, (Jade Pen Needle) 32 gauge x 5/32\" ndle USE ONCE DAILY AS DIRECTED WITH INSULIN    Basaglar KwikPen U-100 Insulin 100 unit/mL (3 mL) inpn INJECT 25 UNITS UNDER THE SKIN DAILY (Patient taking differently: 15 Units.)    glucose blood VI test strips (ACCU-CHEK SMARTVIEW TEST STRIP) strip Test Fasting Blood Sugar Once Daily. Dx E11.9    lancets (ACCU-CHEK FASTCLIX LANCING DEV) misc Test Fasting Blood Sugar Once Daily    Blood-Glucose Meter monitoring kit 1 meter    aspirin delayed-release 81 mg tablet Take 1 Tab by mouth daily. No current facility-administered medications for this visit.

## 2021-05-05 NOTE — TELEPHONE ENCOUNTER
Call made to patient; no answer lvm to give us a call back here at the office regarding referral to pulm for sleep apnea. Awaiting a call back.

## 2021-05-13 ENCOUNTER — TELEPHONE (OUTPATIENT)
Dept: INTERNAL MEDICINE CLINIC | Age: 66
End: 2021-05-13

## 2021-05-13 NOTE — TELEPHONE ENCOUNTER
Per PCP, medical record request sent via [employer] Routing Function to Medical Records at Dr. Joanna Brooks' Office for a copy of patient's most recent colonoscopy report & applicable pathology report.   [facility] Electronic Routing Function fax results report shows a successful transmission of the medical record request.

## 2021-08-13 DIAGNOSIS — E78.5 DYSLIPIDEMIA: ICD-10-CM

## 2021-08-13 RX ORDER — ATORVASTATIN CALCIUM 10 MG/1
TABLET, FILM COATED ORAL
Qty: 90 TABLET | Refills: 1 | OUTPATIENT
Start: 2021-08-13

## 2021-08-13 RX ORDER — ATORVASTATIN CALCIUM 10 MG/1
TABLET, FILM COATED ORAL
Qty: 90 TABLET | Refills: 1 | Status: SHIPPED | OUTPATIENT
Start: 2021-08-13 | End: 2022-02-02

## 2021-08-13 NOTE — TELEPHONE ENCOUNTER
----- Message from Benjamin Dang sent at 8/13/2021  1:00 PM EDT -----  Regarding: MD Santi/Refill  Medication Refill    Caller (if not patient): Jerrica      Relationship of caller (if not patient): Spouse. Best contact number(s): [phone]      Name of medication and dosage if known: atorvastatin (LIPITOR) 10 mg tablet       Is patient out of this medication (yes/no): Yes      Pharmacy name: [address] [place] [address] listed in chart? (yes/no): Yes  Pharmacy phone number:   [phone]       Details to clarify the request: Spouse requesting rx be filled before the weekend because pt is out.       Benjamin Dang

## 2021-09-14 DIAGNOSIS — E11.9 TYPE 2 DIABETES MELLITUS WITH HEMOGLOBIN A1C GOAL OF LESS THAN 7.0% (HCC): ICD-10-CM

## 2021-12-11 DIAGNOSIS — E11.9 DIABETES MELLITUS TYPE 2, INSULIN DEPENDENT (HCC): ICD-10-CM

## 2021-12-11 DIAGNOSIS — Z79.4 DIABETES MELLITUS TYPE 2, INSULIN DEPENDENT (HCC): ICD-10-CM

## 2021-12-12 RX ORDER — INSULIN GLARGINE 100 [IU]/ML
INJECTION, SOLUTION SUBCUTANEOUS
Qty: 15 PEN | Refills: 5 | Status: SHIPPED | OUTPATIENT
Start: 2021-12-12 | End: 2022-05-05

## 2022-02-02 DIAGNOSIS — E78.5 DYSLIPIDEMIA: ICD-10-CM

## 2022-02-02 RX ORDER — ATORVASTATIN CALCIUM 10 MG/1
TABLET, FILM COATED ORAL
Qty: 90 TABLET | Refills: 1 | Status: SHIPPED | OUTPATIENT
Start: 2022-02-02 | End: 2022-08-01

## 2022-03-14 DIAGNOSIS — E11.9 TYPE 2 DIABETES MELLITUS WITH HEMOGLOBIN A1C GOAL OF LESS THAN 7.0% (HCC): ICD-10-CM

## 2022-03-19 PROBLEM — D75.1 ERYTHROCYTOSIS: Status: ACTIVE | Noted: 2019-01-01

## 2022-03-19 PROBLEM — E78.5 DYSLIPIDEMIA: Status: ACTIVE | Noted: 2018-03-01

## 2022-03-20 PROBLEM — R03.0 BORDERLINE SYSTOLIC HTN: Status: ACTIVE | Noted: 2018-06-01

## 2022-03-20 PROBLEM — U07.1 COVID-19: Status: ACTIVE | Noted: 2021-01-28

## 2022-05-05 ENCOUNTER — OFFICE VISIT (OUTPATIENT)
Dept: INTERNAL MEDICINE CLINIC | Age: 67
End: 2022-05-05
Payer: MEDICARE

## 2022-05-05 VITALS
HEART RATE: 67 BPM | OXYGEN SATURATION: 97 % | SYSTOLIC BLOOD PRESSURE: 116 MMHG | HEIGHT: 72 IN | TEMPERATURE: 98.3 F | RESPIRATION RATE: 16 BRPM | WEIGHT: 204.6 LBS | BODY MASS INDEX: 27.71 KG/M2 | DIASTOLIC BLOOD PRESSURE: 75 MMHG

## 2022-05-05 DIAGNOSIS — E11.9 TYPE 2 DIABETES MELLITUS WITH HEMOGLOBIN A1C GOAL OF LESS THAN 7.0% (HCC): ICD-10-CM

## 2022-05-05 DIAGNOSIS — D75.1 ERYTHROCYTOSIS: ICD-10-CM

## 2022-05-05 DIAGNOSIS — Z00.00 WELCOME TO MEDICARE PREVENTIVE VISIT: Primary | ICD-10-CM

## 2022-05-05 DIAGNOSIS — Z79.4 DIABETES MELLITUS TYPE 2, INSULIN DEPENDENT (HCC): ICD-10-CM

## 2022-05-05 DIAGNOSIS — M10.9 GOUT, UNSPECIFIED CAUSE, UNSPECIFIED CHRONICITY, UNSPECIFIED SITE: ICD-10-CM

## 2022-05-05 DIAGNOSIS — D22.30 ATYPICAL NEVUS OF FACE: ICD-10-CM

## 2022-05-05 DIAGNOSIS — N42.9 PROSTATE DISEASE: ICD-10-CM

## 2022-05-05 DIAGNOSIS — E11.9 DIABETES MELLITUS TYPE 2, INSULIN DEPENDENT (HCC): ICD-10-CM

## 2022-05-05 DIAGNOSIS — E78.5 DYSLIPIDEMIA: ICD-10-CM

## 2022-05-05 PROCEDURE — G8419 CALC BMI OUT NRM PARAM NOF/U: HCPCS | Performed by: INTERNAL MEDICINE

## 2022-05-05 PROCEDURE — 3046F HEMOGLOBIN A1C LEVEL >9.0%: CPT | Performed by: INTERNAL MEDICINE

## 2022-05-05 PROCEDURE — G0402 INITIAL PREVENTIVE EXAM: HCPCS | Performed by: INTERNAL MEDICINE

## 2022-05-05 PROCEDURE — 3017F COLORECTAL CA SCREEN DOC REV: CPT | Performed by: INTERNAL MEDICINE

## 2022-05-05 PROCEDURE — G0463 HOSPITAL OUTPT CLINIC VISIT: HCPCS | Performed by: INTERNAL MEDICINE

## 2022-05-05 PROCEDURE — 99214 OFFICE O/P EST MOD 30 MIN: CPT | Performed by: INTERNAL MEDICINE

## 2022-05-05 PROCEDURE — 1101F PT FALLS ASSESS-DOCD LE1/YR: CPT | Performed by: INTERNAL MEDICINE

## 2022-05-05 PROCEDURE — 2022F DILAT RTA XM EVC RTNOPTHY: CPT | Performed by: INTERNAL MEDICINE

## 2022-05-05 PROCEDURE — 93005 ELECTROCARDIOGRAM TRACING: CPT | Performed by: INTERNAL MEDICINE

## 2022-05-05 PROCEDURE — G8536 NO DOC ELDER MAL SCRN: HCPCS | Performed by: INTERNAL MEDICINE

## 2022-05-05 PROCEDURE — G8510 SCR DEP NEG, NO PLAN REQD: HCPCS | Performed by: INTERNAL MEDICINE

## 2022-05-05 PROCEDURE — G0403 EKG FOR INITIAL PREVENT EXAM: HCPCS | Performed by: INTERNAL MEDICINE

## 2022-05-05 PROCEDURE — G8427 DOCREV CUR MEDS BY ELIG CLIN: HCPCS | Performed by: INTERNAL MEDICINE

## 2022-05-05 RX ORDER — INSULIN GLARGINE 100 [IU]/ML
15 INJECTION, SOLUTION SUBCUTANEOUS
Qty: 15 ML | Refills: 5
Start: 2022-05-05 | End: 2022-05-05 | Stop reason: SDUPTHER

## 2022-05-05 RX ORDER — INSULIN GLARGINE 100 [IU]/ML
15 INJECTION, SOLUTION SUBCUTANEOUS
Qty: 5 ADJUSTABLE DOSE PRE-FILLED PEN SYRINGE | Refills: 5 | Status: SHIPPED | OUTPATIENT
Start: 2022-05-05

## 2022-05-05 RX ORDER — PNEUMOCOCCAL 20-VALENT CONJUGATE VACCINE 2.2; 2.2; 2.2; 2.2; 2.2; 2.2; 2.2; 2.2; 2.2; 2.2; 2.2; 2.2; 2.2; 2.2; 2.2; 2.2; 4.4; 2.2; 2.2; 2.2 UG/.5ML; UG/.5ML; UG/.5ML; UG/.5ML; UG/.5ML; UG/.5ML; UG/.5ML; UG/.5ML; UG/.5ML; UG/.5ML; UG/.5ML; UG/.5ML; UG/.5ML; UG/.5ML; UG/.5ML; UG/.5ML; UG/.5ML; UG/.5ML; UG/.5ML; UG/.5ML
0.5 INJECTION, SUSPENSION INTRAMUSCULAR ONCE
Qty: 1 EACH | Refills: 0 | Status: SHIPPED | OUTPATIENT
Start: 2022-05-05 | End: 2022-05-05

## 2022-05-05 NOTE — PATIENT INSTRUCTIONS
Grzegorz Meléndez [address]  [facility]    [facility]  [address] [facility], [address] [facility], [address]  [phone]  [facility])    [address]  [address] [address] [facility], [address]  [phone]  (Fridays only)

## 2022-05-05 NOTE — PROGRESS NOTES
This is a \"Welcome to [employer]"  Initial Preventive Physical Examination (IPPE) providing Personalized Prevention Plan Services (Performed in the first 12 months of enrollment)    I have reviewed the patient's medical history in detail and updated the computerized patient record. He retired June 2021 from [employer]. Diabetes Mellitus follow-up  Last hemoglobin a1c   Lab Results   Component Value Date/Time    Hemoglobin A1c 6.8 (H) 02/16/2021 10:21 AM    Hemoglobin A1c (POC) 5.9 07/08/2019 04:20 AM   medication compliance: compliant all of the time. States he is taking Jardiance 25 mg daily and glargine 15 units nightly. Diabetic diet compliance: compliant most of the time. Home glucose monitoring: fasting values NOT DONE    Hypoglycemic episodes:  None. Further diabetic ROS: no polyuria or polydipsia, no chest pain, dyspnea or TIA's, no numbness, tingling or pain in extremities. History     Past Medical History:   Diagnosis Date    COVID-19 01/28/2021    Diabetes mellitus type 2, controlled ([address] 2015    Dyslipidemia 3/1/2018    Erythrocytosis 2019    JAK2 neg    Gout 06/2020    Mild hypertension 6/1/2018    Osteoarthrosis of knee     saw Dr. David Shay 2019       Past Surgical History:   Procedure Laterality Date    HX COLONOSCOPY  02/18/2020    tubular adenoma. repeat 5 years. Dr. Eliz Salazar. (report received 5/17/21)    HX HERNIA REPAIR      R inguinal    HX WISDOM TEETH EXTRACTION         Current Outpatient Medications   Medication Sig    Prevnar 20, PF, 0.5 mL syrg injection 0.5 mL by IntraMUSCular route once for 1 dose.     empagliflozin (Jardiance) 25 mg tablet TAKE ONE TABLET BY MOUTH DAILY    atorvastatin (LIPITOR) 10 mg tablet TAKE ONE TABLET BY MOUTH DAILY    Basaglar KwikPen U-100 Insulin 100 unit/mL (3 mL) inpn INJECT 25 UNITS UNDER THE SKIN DAILY (Patient taking differently: 15 Units.)    Insulin Needles, Disposable, (Jade Pen Needle) 32 gauge x 5/32\" ndle USE ONCE DAILY AS DIRECTED WITH INSULIN    glucose blood VI test strips (ACCU-CHEK SMARTVIEW TEST STRIP) strip Test Fasting Blood Sugar Once Daily. Dx E11.9    lancets (ACCU-CHEK FASTCLIX LANCING DEV) misc Test Fasting Blood Sugar Once Daily    Blood-Glucose Meter monitoring kit 1 meter    fluticasone propionate (FLONASE) 50 mcg/actuation nasal spray 2 Sprays by Both Nostrils route daily. (Patient not taking: Reported on 5/5/2022)     No current facility-administered medications for this visit. No Known Allergies    Family History   Problem Relation Age of Onset    Diabetes Father     Hypertension Father     Cancer Mother         breast/lung    Hypertension Sister     Hypertension Brother     No Known Problems Daughter     No Known Problems Daughter     No Known Problems Son     Elevated Lipids Neg Hx        Social History     Socioeconomic History    Marital status:      Spouse name: Sidney Glaser Number of children: 1    Years of education: Not on file    Highest education level: Not on file   Occupational History     Employer: [employer]   Tobacco Use    Smoking status: Never Smoker    Smokeless tobacco: Current User     Types: Snuff    Tobacco comment: dip/everyday    Substance and Sexual Activity    Alcohol use: Not Currently     Alcohol/week: 0.0 standard drinks    Drug use: Never    Sexual activity: Yes     Partners: Female   Other Topics Concern    Not on file   Social History Narrative    Not on file     Social Determinants of Health     Financial Resource Strain:     Difficulty of Paying Living Expenses: Not on file   Food Insecurity:     Worried About Running Out of Food in the Last Year: Not on file    Kristin of Food in the Last Year: Not on file   Transportation Needs:     Lack of Transportation (Medical): Not on file    Lack of Transportation (Non-Medical):  Not on file   Physical Activity:     Days of Exercise per Week: Not on file    Minutes of Exercise per Session: Not on file   Stress:     Feeling of Stress : Not on file   Social Connections:     Frequency of Communication with Friends and Family: Not on file    Frequency of Social Gatherings with Friends and Family: Not on file    Attends Congregation Services: Not on file    Active Member of [address] [place] [address] or Organizations: Not on file    Attends Club or Organization Meetings: Not on file    Marital Status: Not on file   Intimate Partner Violence:     Fear of Current or Ex-Partner: Not on file    Emotionally Abused: Not on file    Physically Abused: Not on file    Sexually Abused: Not on file   Housing Stability:     Unable to Pay for Housing in the Last Year: Not on file    Number of [place] in the Last Year: Not on file    Unstable Housing in the Last Year: Not on file         Depression Risk Screen     3 most recent [facility] Screens 5/5/2022 5/4/2021 1/10/2020   Little interest or pleasure in doing things Not at all Not at all Not at all   Feeling down, depressed, irritable, or hopeless Not at all Not at all Not at all   Total Score PHQ 2 0 0 0         Alcohol Risk Screen   Do you average more than 1 drink per night or more than 7 drinks a week: No    In the past three months have you have had more than 4 drinks containing alcohol on one occasion: No      Functional Ability and Level of Safety   Diet: The patient is prescribed and follows a special diet. Hearing: Hearing is good. Vision Screening:  Vision is good. No exam data present     Activities of Daily Living: The home contains: no safety equipment. Patient does total self care     Ambulation: with no difficulty     Exercise level: moderately active     Fall Risk Screen:  Fall Risk Assessment, last 12 mths 5/5/2022   Able to walk? Yes   Fall in past 12 months? 0   Do you feel unsteady?  0   Are you worried about falling 0       Abuse Screen:  Patient is not abused    Blood pressure 116/75, pulse 67, temperature 98.3 °F (36.8 °C), temperature source Oral, resp. rate 16, height 6' (1.829 m), weight 204 lb 9.6 oz (92.8 kg), SpO2 97 %. PHYSCIAL EXAM  Appears well, alert and oriented x 3, pleasant and cooperative. Blood pressure 116/75, pulse 67, temperature 98.3 °F (36.8 °C), temperature source Oral, resp. rate 16, height 6' (1.829 m), weight 204 lb 9.6 oz (92.8 kg), SpO2 97 %. No rashes or significant lesions. Neck supple and free of adenopathy, or masses. No thyromegaly or carotid bruits. Cranial nerves normal.   Lungs are clear to auscultation. Heart sounds are normal with no murmurs, clicks, gallops or rubs. Abdomen is soft, non- tender, with no masses or organomegaly. Extremities, peripheral pulses and reflexes are normal.  .   Foot exam  - skin intact, mild dryness w no fissures, no rashes, no significant ulcers or callous formation. Sensation intact by microfilament or light touch      Screening EKG   EKG order placed: Yes    Patient Care Team   Patient Care Team:  Jamari Brenner MD as PCP - General (Internal Medicine Physician)  Jamari Brenner MD as PCP - [facility] Empaneled Provider  Jesús Bryant MD (Gastroenterology)    End of Life Planning   Advanced care planning directives were discussed with the patient and /or family/caregiver. Assessment/Plan   Education and counseling provided:  Are appropriate based on today's review and evaluation  Pneumococcal Vaccine  Diagnoses and all orders for this visit:    1. Welcome to Medicare preventive visit  ACP reviewed. Wife Radha Hamilton is his healthcare decision-maker.  -     AMB POC EKG ROUTINE W/ 12 LEADS, INTER & REP  -     Prevnar 20, PF, 0.5 mL syrg injection; 0.5 mL by IntraMUSCular route once for 1 dose. 2. Diabetes mellitus type 2, insulin dependent (HCC)  No known complications. Recommend diabetic retinal exam.  Unclear control. He reports compliance with medication but has not been checking glucoses. Denies any symptoms of hypoglycemia.   He prefers to remain on insulin although may be able to cut back. Also may need to consider cutting back on Jardiance to one half of 25 mg due to cost.  -     MICROALBUMIN, UR, RAND W/ MICROALB/CREAT RATIO; Future  -     METABOLIC PANEL, COMPREHENSIVE; Future  -     HEMOGLOBIN A1C WITH EAG; Future  -     insulin glargine (Basaglar KwikPen U-100 Insulin) 100 unit/mL (3 mL) inpn; 15 Units by SubCUTAneous route nightly. 3. Type 2 diabetes mellitus with hemoglobin A1c goal of less than 7.0% ([facility])  -     empagliflozin (Jardiance) 25 mg tablet; TAKE ONE TABLET BY MOUTH DAILY    4. Dyslipidemia  Unclear control on atorvastatin 10 mg daily. Recommend aggressive control given diabetes and high cardiovascular risk.  -     LIPID PANEL; Future    5. Erythrocytosis  History of mild erythrocytosis. He is not a smoker but does use snuff. -     CBC W/O DIFF; Future    6. Gout, unspecified cause, unspecified chronicity, unspecified site  Currently asymptomatic. On no urate lowering therapy  -     URIC ACID; Future    7. Prostate disease screening  -     PSA W/ REFLX FREE PSA; Future    8. Atypical nevus of face  He does have significant sun-damaged skin and I do recommend consulting with dermatology.       Health Maintenance Due   Topic Date Due    Eye Exam Retinal or Dilated  Never done    Pneumococcal 65+ years (2 - PCV) 01/10/2021    A1C test (Diabetic or Prediabetic)  02/16/2022    MICROALBUMIN Q1  02/16/2022    Lipid Screen  02/16/2022

## 2022-05-06 LAB
ALBUMIN SERPL-MCNC: 3.9 G/DL (ref 3.5–5)
ALBUMIN/GLOB SERPL: 1.2 {RATIO} (ref 1.1–2.2)
ALP SERPL-CCNC: 94 U/L (ref 45–117)
ALT SERPL-CCNC: 32 U/L (ref 12–78)
ANION GAP SERPL CALC-SCNC: 8 MMOL/L (ref 5–15)
AST SERPL-CCNC: 17 U/L (ref 15–37)
BILIRUB SERPL-MCNC: 0.9 MG/DL (ref 0.2–1)
BUN SERPL-MCNC: 20 MG/DL (ref 6–20)
BUN/CREAT SERPL: 17 (ref 12–20)
CALCIUM SERPL-MCNC: 9.5 MG/DL (ref 8.5–10.1)
CHLORIDE SERPL-SCNC: 108 MMOL/L (ref 97–108)
CHOLEST SERPL-MCNC: 160 MG/DL
CO2 SERPL-SCNC: 24 MMOL/L (ref 21–32)
COMMENT, HOLDF: NORMAL
CREAT SERPL-MCNC: 1.16 MG/DL (ref 0.7–1.3)
CREAT UR-MCNC: 122 MG/DL
ERYTHROCYTE [DISTWIDTH] IN BLOOD BY AUTOMATED COUNT: 12.9 % (ref 11.5–14.5)
EST. AVERAGE GLUCOSE BLD GHB EST-MCNC: 151 MG/DL
GLOBULIN SER CALC-MCNC: 3.3 G/DL (ref 2–4)
GLUCOSE SERPL-MCNC: 116 MG/DL (ref 65–100)
HBA1C MFR BLD: 6.9 % (ref 4–5.6)
HCT VFR BLD AUTO: 56.6 % (ref 36.6–50.3)
HDLC SERPL-MCNC: 49 MG/DL
HDLC SERPL: 3.3 {RATIO} (ref 0–5)
HGB BLD-MCNC: 18.4 G/DL (ref 12.1–17)
LDLC SERPL CALC-MCNC: 73.8 MG/DL (ref 0–100)
MCH RBC QN AUTO: 30.7 PG (ref 26–34)
MCHC RBC AUTO-ENTMCNC: 32.5 G/DL (ref 30–36.5)
MCV RBC AUTO: 94.3 FL (ref 80–99)
MICROALBUMIN UR-MCNC: 3.28 MG/DL
MICROALBUMIN/CREAT UR-RTO: 27 MG/G (ref 0–30)
NRBC # BLD: 0 K/UL (ref 0–0.01)
NRBC BLD-RTO: 0 PER 100 WBC
PLATELET # BLD AUTO: 256 K/UL (ref 150–400)
PMV BLD AUTO: 10.7 FL (ref 8.9–12.9)
POTASSIUM SERPL-SCNC: 4.4 MMOL/L (ref 3.5–5.1)
PROT SERPL-MCNC: 7.2 G/DL (ref 6.4–8.2)
RBC # BLD AUTO: 6 M/UL (ref 4.1–5.7)
SAMPLES BEING HELD,HOLD: NORMAL
SODIUM SERPL-SCNC: 140 MMOL/L (ref 136–145)
TRIGL SERPL-MCNC: 186 MG/DL (ref ?–150)
URATE SERPL-MCNC: 5.6 MG/DL (ref 3.5–7.2)
VLDLC SERPL CALC-MCNC: 37.2 MG/DL
WBC # BLD AUTO: 9.2 K/UL (ref 4.1–11.1)

## 2022-05-07 LAB
PSA SERPL-MCNC: 0.5 NG/ML (ref 0–4)
REFLEX CRITERIA: NORMAL

## 2022-07-06 RX ORDER — PEN NEEDLE, DIABETIC 31 GX3/16"
NEEDLE, DISPOSABLE MISCELLANEOUS
Qty: 100 PEN NEEDLE | Refills: 5 | Status: SHIPPED | OUTPATIENT
Start: 2022-07-06

## 2022-09-19 DIAGNOSIS — E11.9 TYPE 2 DIABETES MELLITUS WITH HEMOGLOBIN A1C GOAL OF LESS THAN 7.0% (HCC): Primary | ICD-10-CM

## 2022-09-19 RX ORDER — BLOOD-GLUCOSE METER
1 EACH MISCELLANEOUS DAILY
Qty: 1 EACH | Refills: 0 | Status: SHIPPED | OUTPATIENT
Start: 2022-09-19

## 2022-10-05 ENCOUNTER — OFFICE VISIT (OUTPATIENT)
Dept: INTERNAL MEDICINE CLINIC | Age: 67
End: 2022-10-05
Payer: MEDICARE

## 2022-10-05 VITALS
OXYGEN SATURATION: 97 % | HEART RATE: 68 BPM | WEIGHT: 206.8 LBS | TEMPERATURE: 98 F | HEIGHT: 72 IN | DIASTOLIC BLOOD PRESSURE: 82 MMHG | SYSTOLIC BLOOD PRESSURE: 145 MMHG | BODY MASS INDEX: 28.01 KG/M2 | RESPIRATION RATE: 16 BRPM

## 2022-10-05 DIAGNOSIS — G45.3 AMAUROSIS FUGAX: ICD-10-CM

## 2022-10-05 DIAGNOSIS — E78.5 DYSLIPIDEMIA: ICD-10-CM

## 2022-10-05 DIAGNOSIS — D75.1 ERYTHROCYTOSIS: ICD-10-CM

## 2022-10-05 DIAGNOSIS — E11.9 DIABETES MELLITUS TYPE 2, INSULIN DEPENDENT (HCC): Primary | ICD-10-CM

## 2022-10-05 DIAGNOSIS — R03.0 BORDERLINE SYSTOLIC HTN: ICD-10-CM

## 2022-10-05 DIAGNOSIS — Z79.4 DIABETES MELLITUS TYPE 2, INSULIN DEPENDENT (HCC): Primary | ICD-10-CM

## 2022-10-05 PROCEDURE — G8536 NO DOC ELDER MAL SCRN: HCPCS | Performed by: INTERNAL MEDICINE

## 2022-10-05 PROCEDURE — 3044F HG A1C LEVEL LT 7.0%: CPT | Performed by: INTERNAL MEDICINE

## 2022-10-05 PROCEDURE — G8510 SCR DEP NEG, NO PLAN REQD: HCPCS | Performed by: INTERNAL MEDICINE

## 2022-10-05 PROCEDURE — 2022F DILAT RTA XM EVC RTNOPTHY: CPT | Performed by: INTERNAL MEDICINE

## 2022-10-05 PROCEDURE — 1101F PT FALLS ASSESS-DOCD LE1/YR: CPT | Performed by: INTERNAL MEDICINE

## 2022-10-05 PROCEDURE — G8417 CALC BMI ABV UP PARAM F/U: HCPCS | Performed by: INTERNAL MEDICINE

## 2022-10-05 PROCEDURE — 3017F COLORECTAL CA SCREEN DOC REV: CPT | Performed by: INTERNAL MEDICINE

## 2022-10-05 PROCEDURE — 99214 OFFICE O/P EST MOD 30 MIN: CPT | Performed by: INTERNAL MEDICINE

## 2022-10-05 PROCEDURE — G0463 HOSPITAL OUTPT CLINIC VISIT: HCPCS | Performed by: INTERNAL MEDICINE

## 2022-10-05 PROCEDURE — G8427 DOCREV CUR MEDS BY ELIG CLIN: HCPCS | Performed by: INTERNAL MEDICINE

## 2022-10-05 RX ORDER — BLOOD SUGAR DIAGNOSTIC
STRIP MISCELLANEOUS
Qty: 100 STRIP | Refills: 11 | Status: SHIPPED | OUTPATIENT
Start: 2022-10-05

## 2022-10-05 RX ORDER — DULAGLUTIDE 0.75 MG/.5ML
0.75 INJECTION, SOLUTION SUBCUTANEOUS
Qty: 4 EACH | Refills: 0 | Status: SHIPPED | OUTPATIENT
Start: 2022-10-05 | End: 2022-10-10 | Stop reason: ALTCHOICE

## 2022-10-05 RX ORDER — ASPIRIN 81 MG/1
81 TABLET ORAL DAILY
Qty: 30 TABLET | Refills: 5
Start: 2022-10-05

## 2022-10-05 NOTE — PATIENT INSTRUCTIONS
Continue Jardiance for diabetes    Start Weekly injection of Trulicity. Goal to increase dose monthly at least 1.5 mg (next month)    Check AM glucose. In 1 week, if your fasting glucose is less than 130, then stop insulin. If fasting is greater than 130 in  1 week, then cut insulin to 10 units night. Let me know if fasting is > 150  If fasting glucose is ever below 90, then stop insulin. Learning About Low-Carbohydrate Diets  What is a low-carbohydrate diet? A low-carbohydrate (or \"low-carb\") diet limits foods and drinks that have carbohydrates. This includes grains, fruits, milk and yogurt, and starchy vegetables like potatoes, beans, and corn. It also avoids foods and drinks that have added sugar. Instead, low-carb diets include foods that are high in protein and fat. Why might you follow a low-carb diet? Low-carb diets may be used for a variety of reasons, such as for weight loss. People who have diabetes may use a low-carb diet to help manage their blood sugar levels. What should you do before you start the diet? Talk to your doctor before you try any diet. This is even more important if you have health problems like kidney disease, heart disease, or diabetes. Your doctor may suggest that you meet with a registered dietitian. A dietitian can help you make an eating plan that works for you. What foods do you eat on a low-carb diet? On a low-carb diet, you choose foods that are high in protein and fat. Examples of these are:  Meat, poultry, and fish. Eggs. Nuts, such as walnuts, pecans, almonds, and peanuts. Peanut butter and other nut butters. Tofu. Avocado. Wirt Orts. Non-starchy vegetables like broccoli, cauliflower, green beans, mushrooms, peppers, lettuce, and spinach. Unsweetened non-dairy milks like almond milk and coconut milk. Cheese, cottage cheese, and cream cheese.   Current as of: December 17, 2020               Content Version: 12.8  © 0588-2251 [employer]. Care instructions adapted under license by [employer] (which disclaims liability or warranty for this information). If you have questions about a medical condition or this instruction, always ask your healthcare professional. [address] any warranty or liability for your use of this information. Learning About Low-Carbohydrate Foods  What foods are low in carbohydrate? The foods you eat contain nutrients, such as vitamins and minerals. Carbohydrate is a nutrient. Your body needs the right amount to stay healthy and work as it should. You can use the list below to help you make choices about which foods to eat. Some foods that are lower in carbohydrate include:  Dairy and dairy alternatives  Cheese  Cottage cheese  Cream cheese  Nut milk (unsweetened)  Soy milk (unsweetened)  Yogurt (Greek, plain)  Fruits  Avocado  [employer] and other protein foods  Almonds  Beef  Chicken  Cod  Eggs  Halibut  Peanut butter and other nut butters  Pistachios  Pork  Pumpkin seeds  Tofu  [place]  Tuna  [place] ([facility])  Walnuts  Vegetables  Broccoli  Carrots  Cauliflower  Green beans  Mushrooms  Peppers  Salad greens  Spinach  Tomatoes  Work with your doctor to find out how much of this nutrient you need. Depending on your health, you may need more or less of it in your diet. Where can you learn more? Go to http://www.gray.com/  Enter C470 in the search box to learn more about \"Learning About Low-Carbohydrate Foods. \"  Current as of: December 17, 2020               Content Version: 12.8  © 2006-2021 [employer]. Care instructions adapted under license by [employer] (which disclaims liability or warranty for this information).  If you have questions about a medical condition or this instruction, always ask your healthcare professional. [address] any warranty or liability for your use of this information.
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
## 2022-10-06 LAB
ALBUMIN SERPL-MCNC: 4 G/DL (ref 3.5–5)
ALBUMIN/GLOB SERPL: 1.2 {RATIO} (ref 1.1–2.2)
ALP SERPL-CCNC: 85 U/L (ref 45–117)
ALT SERPL-CCNC: 30 U/L (ref 12–78)
ANION GAP SERPL CALC-SCNC: 4 MMOL/L (ref 5–15)
AST SERPL-CCNC: 15 U/L (ref 15–37)
BASOPHILS # BLD: 0.1 K/UL (ref 0–0.1)
BASOPHILS NFR BLD: 2 % (ref 0–1)
BILIRUB SERPL-MCNC: 1 MG/DL (ref 0.2–1)
BUN SERPL-MCNC: 17 MG/DL (ref 6–20)
BUN/CREAT SERPL: 13 (ref 12–20)
CALCIUM SERPL-MCNC: 9.4 MG/DL (ref 8.5–10.1)
CHLORIDE SERPL-SCNC: 108 MMOL/L (ref 97–108)
CO2 SERPL-SCNC: 30 MMOL/L (ref 21–32)
CREAT SERPL-MCNC: 1.27 MG/DL (ref 0.7–1.3)
DIFFERENTIAL METHOD BLD: ABNORMAL
EOSINOPHIL # BLD: 0.2 K/UL (ref 0–0.4)
EOSINOPHIL NFR BLD: 3 % (ref 0–7)
ERYTHROCYTE [DISTWIDTH] IN BLOOD BY AUTOMATED COUNT: 12.5 % (ref 11.5–14.5)
EST. AVERAGE GLUCOSE BLD GHB EST-MCNC: 169 MG/DL
GLOBULIN SER CALC-MCNC: 3.3 G/DL (ref 2–4)
GLUCOSE SERPL-MCNC: 129 MG/DL (ref 65–100)
HBA1C MFR BLD: 7.5 % (ref 4–5.6)
HCT VFR BLD AUTO: 56.8 % (ref 36.6–50.3)
HGB BLD-MCNC: 18.4 G/DL (ref 12.1–17)
IMM GRANULOCYTES # BLD AUTO: 0 K/UL (ref 0–0.04)
IMM GRANULOCYTES NFR BLD AUTO: 0 % (ref 0–0.5)
LYMPHOCYTES # BLD: 1.9 K/UL (ref 0.8–3.5)
LYMPHOCYTES NFR BLD: 24 % (ref 12–49)
MCH RBC QN AUTO: 30.3 PG (ref 26–34)
MCHC RBC AUTO-ENTMCNC: 32.4 G/DL (ref 30–36.5)
MCV RBC AUTO: 93.6 FL (ref 80–99)
MONOCYTES # BLD: 0.7 K/UL (ref 0–1)
MONOCYTES NFR BLD: 9 % (ref 5–13)
NEUTS SEG # BLD: 5 K/UL (ref 1.8–8)
NEUTS SEG NFR BLD: 62 % (ref 32–75)
NRBC # BLD: 0 K/UL (ref 0–0.01)
NRBC BLD-RTO: 0 PER 100 WBC
PLATELET # BLD AUTO: 250 K/UL (ref 150–400)
PMV BLD AUTO: 10.6 FL (ref 8.9–12.9)
POTASSIUM SERPL-SCNC: 4.5 MMOL/L (ref 3.5–5.1)
PROT SERPL-MCNC: 7.3 G/DL (ref 6.4–8.2)
RBC # BLD AUTO: 6.07 M/UL (ref 4.1–5.7)
SODIUM SERPL-SCNC: 142 MMOL/L (ref 136–145)
WBC # BLD AUTO: 8.1 K/UL (ref 4.1–11.1)

## 2022-10-10 ENCOUNTER — TELEPHONE (OUTPATIENT)
Dept: INTERNAL MEDICINE CLINIC | Age: 67
End: 2022-10-10

## 2022-10-10 RX ORDER — SEMAGLUTIDE 1.34 MG/ML
INJECTION, SOLUTION SUBCUTANEOUS
Qty: 1 BOX | Refills: 1 | Status: SHIPPED | OUTPATIENT
Start: 2022-10-10

## 2022-10-10 NOTE — TELEPHONE ENCOUNTER
Try changing to Ozempic. Rx sent. I do not see any samples of Trulicity, so there must be a shortage of that as well. Could alternatively consider Rybelsus pills if covered by his plan.

## 2022-10-10 NOTE — TELEPHONE ENCOUNTER
Patient's wife called to state that the pharmacy is out of Trulicity and that the pharmacy has contacted the  and not been given an answer as to when they will have more. Patient's wife states that she cannot find any local pharmacy that has the medication. Please call back and advise what they should do.

## 2022-10-10 NOTE — TELEPHONE ENCOUNTER
Spoke with patient and updated on Dr. Antonia Sargent comments, recommendations and script sent to his pharmacy for [address]. He states he will try the Ozempic first. Grateful for the call.

## 2022-10-13 ENCOUNTER — DOCUMENTATION ONLY (OUTPATIENT)
Dept: INTERNAL MEDICINE CLINIC | Age: 67
End: 2022-10-13

## 2022-10-13 NOTE — PROGRESS NOTES
Prior Auth for patients Trulicity 7.19 OA/5.8ZL pen injectors submitted and received the following response: The patient currently has access to the requested medication and a Prior Authorization is not needed for the patient/medication.  Logan Coley LPN

## 2022-10-14 ENCOUNTER — HOSPITAL ENCOUNTER (OUTPATIENT)
Dept: CT IMAGING | Age: 67
Discharge: HOME | End: 2022-10-14
Attending: INTERNAL MEDICINE
Payer: MEDICARE

## 2022-10-14 DIAGNOSIS — G45.3 AMAUROSIS FUGAX: ICD-10-CM

## 2022-10-14 PROCEDURE — 74011000636 HC RX REV CODE- 636: Performed by: INTERNAL MEDICINE

## 2022-10-14 PROCEDURE — 70498 CT ANGIOGRAPHY NECK: CPT

## 2022-10-14 RX ADMIN — IOPAMIDOL 100 ML: 755 INJECTION, SOLUTION INTRAVENOUS at 17:29

## 2022-11-08 ENCOUNTER — DOCUMENTATION ONLY (OUTPATIENT)
Dept: INTERNAL MEDICINE CLINIC | Age: 67
End: 2022-11-08

## 2022-11-08 NOTE — PROGRESS NOTES
Received a request for Prior Auth from [facility] for patients Trulicity. Attempted prior auth and received message-The patient currently has access to the requested medication and a Prior Authorization is not needed for the patient/medication.  Koffi Ramos LPN

## 2022-11-17 ENCOUNTER — HOSPITAL ENCOUNTER (OUTPATIENT)
Dept: GENERAL RADIOLOGY | Age: 67
Discharge: HOME | End: 2022-11-17
Attending: INTERNAL MEDICINE
Payer: MEDICARE

## 2022-11-17 ENCOUNTER — HOSPITAL ENCOUNTER (OUTPATIENT)
Dept: ULTRASOUND IMAGING | Age: 67
Discharge: HOME | End: 2022-11-17
Attending: INTERNAL MEDICINE
Payer: MEDICARE

## 2022-11-17 ENCOUNTER — OFFICE VISIT (OUTPATIENT)
Dept: ONCOLOGY | Age: 67
End: 2022-11-17
Payer: MEDICARE

## 2022-11-17 VITALS
WEIGHT: 203 LBS | SYSTOLIC BLOOD PRESSURE: 126 MMHG | TEMPERATURE: 98.1 F | RESPIRATION RATE: 16 BRPM | HEART RATE: 76 BPM | DIASTOLIC BLOOD PRESSURE: 81 MMHG | OXYGEN SATURATION: 95 % | BODY MASS INDEX: 27.53 KG/M2

## 2022-11-17 DIAGNOSIS — D75.1 ERYTHROCYTOSIS: ICD-10-CM

## 2022-11-17 DIAGNOSIS — D75.1 ERYTHROCYTOSIS: Primary | ICD-10-CM

## 2022-11-17 DIAGNOSIS — R71.8 OTHER ABNORMALITY OF RED BLOOD CELLS: ICD-10-CM

## 2022-11-17 DIAGNOSIS — Z72.0 TOBACCO DIPPER: ICD-10-CM

## 2022-11-17 PROCEDURE — 3017F COLORECTAL CA SCREEN DOC REV: CPT | Performed by: INTERNAL MEDICINE

## 2022-11-17 PROCEDURE — 99204 OFFICE O/P NEW MOD 45 MIN: CPT | Performed by: INTERNAL MEDICINE

## 2022-11-17 PROCEDURE — 76700 US EXAM ABDOM COMPLETE: CPT

## 2022-11-17 PROCEDURE — G0463 HOSPITAL OUTPT CLINIC VISIT: HCPCS | Performed by: INTERNAL MEDICINE

## 2022-11-17 PROCEDURE — G8427 DOCREV CUR MEDS BY ELIG CLIN: HCPCS | Performed by: INTERNAL MEDICINE

## 2022-11-17 PROCEDURE — 1123F ACP DISCUSS/DSCN MKR DOCD: CPT | Performed by: INTERNAL MEDICINE

## 2022-11-17 PROCEDURE — G8417 CALC BMI ABV UP PARAM F/U: HCPCS | Performed by: INTERNAL MEDICINE

## 2022-11-17 PROCEDURE — G8536 NO DOC ELDER MAL SCRN: HCPCS | Performed by: INTERNAL MEDICINE

## 2022-11-17 PROCEDURE — 1101F PT FALLS ASSESS-DOCD LE1/YR: CPT | Performed by: INTERNAL MEDICINE

## 2022-11-17 PROCEDURE — G8432 DEP SCR NOT DOC, RNG: HCPCS | Performed by: INTERNAL MEDICINE

## 2022-11-17 PROCEDURE — 71046 X-RAY EXAM CHEST 2 VIEWS: CPT

## 2022-11-17 NOTE — LETTER
11/18/2022    Patient: Kane Paz   YOB: 1955   Date of Visit: 11/17/2022     Magda Gates, [address] [facility] [address]  Via In Basket    Dear Magda Gates MD,      Thank you for referring Mr. Ashutosh Salcedo to [facility] for evaluation. My notes for this consultation are attached. If you have questions, please do not hesitate to call me. I look forward to following your patient along with you.       Sincerely,    Josey Bailey MD

## 2022-11-17 NOTE — PROGRESS NOTES
[facility]  [address] [facility]  Sharon Guardian: [phone]  F: [phone] Patient ID  Name: Tatiana Fletcher  YOB: 1955  MRN: [id number]  Referring Provider:   Kisha Leone MD  [address] [facility]  [address]  [place],  [address] [place] [address]  Primary Care Provider:   Kisha Leone MD       HEMATOLOGY/MEDICAL ONCOLOGY  NOTE   Date of Visit: 11/17/22   Reason for Evaluation:     Chief Complaint   Patient presents with    New Patient    Referral / Consult       Subjective:     History of Present Illness:     Tatiana Fletcher is a 78 y.o. M who presents for an initial evaluation for polycythemia. This is a chronic problem. Problem has occurred for years. Problem has resolved for a period since 2019 without any significant progression. He reports that he had been off aspirin for about 6 months due to easy bleeding. However, he had restarted. He is not sure if being off it aspirin changed how he feels. He reports remaining active. He reports improvement in his diabetes mellitus condition. Patient reports doing well overall. Patient reports adequate oral intake of food and hydration. Patient denies any bowel or bladder problems. Patient denies any uncontrolled pain. Patient denies any shortness of breath. He denies any known history of iron overload. He denies any official diagnosis of sleep apnea. He reports having interrupted sleep due to a recent family illness. He denies any use of testosterone. -  Past Medical History:   Diagnosis Date    COVID-19 01/28/2021    Diabetes mellitus type 2, controlled ([address] 2015    Dyslipidemia 3/1/2018    Erythrocytosis 2019    JAK2 neg    Gout 06/2020    Mild hypertension 6/1/2018    Osteoarthrosis of knee     saw Dr. Antoine Plaza 2019      Past Surgical History:   Procedure Laterality Date    HX COLONOSCOPY  02/18/2020    tubular adenoma. repeat 5 years. Dr. Lauro Isidro.   (report received 5/17/21)    HX HERNIA REPAIR      R inguinal    HX WISDOM TEETH EXTRACTION        Social History     Tobacco Use    Smoking status: Never    Smokeless tobacco: Current     Types: Snuff    Tobacco comments:     dip/everyday    Substance Use Topics    Alcohol use: Not Currently     Alcohol/week: 0.0 standard drinks      Family History   Problem Relation Age of Onset    Diabetes Father     Hypertension Father     Cancer Mother         breast/lung    Hypertension Sister     Hypertension Brother     No Known Problems Daughter     No Known Problems Daughter     No Known Problems Son     Elevated Lipids Neg Hx      Current Outpatient Medications   Medication Sig    Ozempic 0.25 mg or 0.5 mg/dose (2 mg/1.5 ml) subq pen Start 0.25 mg weekly for 1 month and then increase to 0.5 mg weekly  Indications: type 2 diabetes mellitus    glucose blood VI test strips (Accu-Chek SmartView Test Strip) strip Test Fasting Blood Sugar Once Daily. Dx E11.9    aspirin delayed-release 81 mg tablet Take 1 Tablet by mouth daily. Blood-Glucose Meter (Accu-Chek Guide Me Glucose Mtr) misc 1 Kit by Does Not Apply route daily. atorvastatin (LIPITOR) 10 mg tablet TAKE ONE TABLET BY MOUTH DAILY    Insulin Needles, Disposable, (BD Jade 2nd Gen Pen Needle) 32 gauge x 5/32\" ndle 1 Pen Needle by Does Not Apply route daily. Insulin Needles, Disposable, (Jade Pen Needle) 32 gauge x 5/32\" ndle USE ONCE DAILY AS DIRECTED WITH INSULIN    insulin glargine (Basaglar KwikPen U-100 Insulin) 100 unit/mL (3 mL) inpn 15 Units by SubCUTAneous route nightly. empagliflozin (Jardiance) 25 mg tablet TAKE ONE TABLET BY MOUTH DAILY    lancets (ACCU-CHEK FASTCLIX LANCING DEV) misc Test Fasting Blood Sugar Once Daily     No current facility-administered medications for this visit.       No Known Allergies   -  Review of Systems provided by:patient  General: denies fever, denies fatigue, denies chills, denies appetite loss, and denies night sweats  Eyes: denies any acute vision loss and denies any eye pain. Denies any further episodes of visual disturbance. HEENT: denies epistaxis, denies trouble swallowing, and denies oral mucositis  Cardio: denies any chest pain and denies any leg swelling  Resp: denies any shortness of breath. denies any hemoptysis. Abdomen: denies any abdominal pain, denies any nausea, denies any vomiting, denies any diarrhea, denies any bloody stools, and denies any melena  MSK: denies any myalgias and denies any arthralgias  Skin: denies any rash and denies any itching  Lymph: denies any lymph node enlargement and denies any lymph node tenderness  Neuro: denies any headache and denies any seizure history  : Denies any dysuria. Denies any hematuria. Psych: denies depression and denies anxiety    Objective:   Visit Vitals  /81 (BP 1 Location: Right upper arm, BP Patient Position: Sitting)   Pulse 76   Temp 98.1 °F (36.7 °C)   Resp 16   Wt 203 lb (92.1 kg)   SpO2 95%   BMI 27.53 kg/m²     ECOG PS: 0- Fully active, able to carry on all pre-disease performance without restriction. Physical Exam  Constitutional: No acute distress. and Non-toxic appearance. HENT: Normocephalic and atraumatic head. Eyes: Normal Conjunctivae. Anicteric sclerae. Cardiovascular: S1,S2 auscultated. No pitting edema. Pulmonary: Normal Respiratory Effort. No wheezing. No rhonchi. No rales. Abdominal: Normal bowel sounds. Soft Abdomen to palpation. No abdominal tenderness. Musculoskeletal: No muscle pain on palpation. No temporal muscle wasting on inspection. Skin: No jaundice. No rash. Neurological: Alert and oriented. No tremor on inspection. Normal Gait. Psychiatric: mood normal. normal speech rate. normal affect. Lymph: No cervical, supraclavicular,or axillary lymph node enlargement or tenderness.      Results:     I personally reviewed Epic EHR labs/results below:  Lab Results   Component Value Date/Time    WBC 8.1 10/05/2022 01:09 PM    HGB 18.4 (H) 10/05/2022 01:09 PM HCT 56.8 (H) 10/05/2022 01:09 PM    PLATELET 238 49/82/8330 01:09 PM    MCV 93.6 10/05/2022 01:09 PM    ABS. NEUTROPHILS 5.0 10/05/2022 01:09 PM     Lab Results   Component Value Date/Time    Sodium 142 10/05/2022 01:09 PM    Potassium 4.5 10/05/2022 01:09 PM    Chloride 108 10/05/2022 01:09 PM    CO2 30 10/05/2022 01:09 PM    Glucose 129 (H) 10/05/2022 01:09 PM    BUN 17 10/05/2022 01:09 PM    Creatinine 1.27 10/05/2022 01:09 PM    GFR est AA >60 05/05/2022 11:28 AM    GFR est non-AA >60 05/05/2022 11:28 AM    Calcium 9.4 10/05/2022 01:09 PM    Glucose  10/09/2015 09:30 AM     Lab Results   Component Value Date/Time    Bilirubin, total 1.0 10/05/2022 01:09 PM    ALT (SGPT) 30 10/05/2022 01:09 PM    Alk. phosphatase 85 10/05/2022 01:09 PM    Protein, total 7.3 10/05/2022 01:09 PM    Albumin 4.0 10/05/2022 01:09 PM    Globulin 3.3 10/05/2022 01:09 PM     CTA HEAD NECK W CONT    Result Date: 10/14/2022  CLINICAL HISTORY: Amaurosis fugax EXAMINATION:  CT ANGIOGRAPHY HEAD AND NECK COMPARISON: None TECHNIQUE:  Following the uneventful administration of iodinated contrast material, axial CT angiography of the head and neck was performed. Delayed axial images through the head were also obtained. Coronal and sagittal reconstructions were obtained. Manual postprocessing of images was performed. 3-D  Sagittal maximal intensity projection images were obtained. 3-D Coronal maximal intensity projections were obtained. CT dose reduction was achieved through use of a standardized protocol tailored for this examination and automatic exposure control for dose modulation. This study was analyzed by the [address] ai algorithm. FINDINGS: Delayed contrast-enhanced head CT: The ventricles are midline without hydrocephalus. There is no acute intra or extra-axial hemorrhage. The basal cisterns are clear. The paranasal sinuses are clear. CTA NECK: Great vessels: Two-vessel aortic arch with patent origins.  Right subclavian artery: Patent Left subclavian artery: Patent Right common carotid artery: Patent Left common carotid artery: Patent Cervical right internal carotid artery: Mild noncalcified plaque with no significant stenosis by NASCET criteria. Cervical left internal carotid artery: Mild noncalcified plaque with no significant stenosis by NASCET criteria. Right vertebral artery: Patent Left vertebral artery: Patent The lung apices are clear. The thyroid is homogeneous. No cervical lymphadenopathy. CTA HEAD: Right cavernous internal carotid artery: Patent Left cavernous internal carotid artery: Patent Anterior cerebral arteries: Patent Anterior communicating artery: Patent Right middle cerebral artery: Patent Left middle cerebral artery: Patent Posterior communicating arteries: Patent Posterior cerebral arteries: Patent Basilar artery: Patent Distal vertebral arteries: Patent No evidence for intracranial aneurysm or hemodynamically significant stenosis. No large vessel occlusion or hemodynamically significant carotid stenosis. No acute intracranial abnormality. Assessment and Recommendations:     1. Erythrocytosis  --Discussed work-up for primary vs. Secondary polycythemia. Recommend imaging to assess for any occult liver or renal masses as rare cause of 2ndary polycythemia. Checking mutation drivers for primary polycythemia. - XR CHEST PA LAT; Future  - US ABD COMP; Future  - JAK2 V617F, RFX CALR/E12-15/MPL  - FERRITIN; Future  - CBC WITH AUTOMATED DIFF; Future  - PERIPHERAL SMEAR; Future  - IRON PROFILE; Future  - ERYTHROPOIETIN; Future    2. Other abnormality of red blood cells   -he needs screened for iron overload. - FERRITIN; Future  - IRON PROFILE; Future    3.   -shouldn't increase Hgb; recommend stopping use regardless. Follow-up and Dispositions    Return in about 3 weeks (around 12/8/2022).            Andrew Rodarte MD  Hematology/Medical Oncology Provider  [facility] [address] [facility]  P: [phone]      Signed By:   Sophia Salcedo MD

## 2022-11-17 NOTE — PROGRESS NOTES
[address] at [place]  [phone]    Tirso Shaikh is a 78 y.o. male is being seen for a consult for Erythrocytosis. 1. Have you been to the ER, urgent care clinic since your last visit? Hospitalized since your last visit? No    2. Have you seen or consulted any other health care providers outside of the [address] [place] [address] since your last visit? Include any pap smears or colon screening.  No

## 2022-11-18 PROBLEM — Z72.0 TOBACCO DIPPER: Status: ACTIVE | Noted: 2022-11-18

## 2022-11-18 PROBLEM — R71.8 OTHER ABNORMALITY OF RED BLOOD CELLS: Status: ACTIVE | Noted: 2022-11-18

## 2022-11-18 NOTE — PROGRESS NOTES
[address] at [place]  [phone]    11/18/22 1:39 PM - Called and spoke with the patient. Patient's ID verified x 2. Advised patient of Dr. Chel Daley result note. The patient voiced understanding and gratitude for the call. No further questions or concerns.

## 2022-11-18 NOTE — PROGRESS NOTES
Good news, imaging without mention of any renal,liver masses. We will continue to follow-up on outstanding results.       Thanks,    Dr. Joseph Pozo

## 2022-12-03 NOTE — PROGRESS NOTES
Hi, results seem more consistent with a secondary cause for your hemoglobin elevation. Overall, your hemoglobin has imrpoved. Please keep scheduled follow-up.     Thanks,    Dr. Lizette Garcia

## 2022-12-05 NOTE — PROGRESS NOTES
[address] at [place]  [phone]    12/05/22 12:41 PM - Called and spoke with the patient. Patient's ID verified x 2. Advised patient of Dr. Montenegro Gain result note. He inquired what the secondary cause is/could be. Advised, per NP, a primary cause could be genetic mutation but his testing came back negative. Advised a secondary cause could be sleep apnea, COPD, smoking, or dehydration. Inquired if the patient has any difficulty sleeping at night. The patient denies difficulty sleeping. He states he does not smoke. He will continue to drink water and stay well hydrated. The patient voiced understanding and gratitude for the call. No further questions or concerns.

## 2022-12-08 ENCOUNTER — OFFICE VISIT (OUTPATIENT)
Dept: INTERNAL MEDICINE CLINIC | Age: 67
End: 2022-12-08
Payer: MEDICARE

## 2022-12-08 VITALS
HEIGHT: 72 IN | DIASTOLIC BLOOD PRESSURE: 86 MMHG | SYSTOLIC BLOOD PRESSURE: 130 MMHG | BODY MASS INDEX: 27.36 KG/M2 | WEIGHT: 202 LBS | RESPIRATION RATE: 18 BRPM | OXYGEN SATURATION: 98 % | TEMPERATURE: 98 F | HEART RATE: 83 BPM

## 2022-12-08 DIAGNOSIS — Z79.4 DIABETES MELLITUS TYPE 2, INSULIN DEPENDENT (HCC): Primary | ICD-10-CM

## 2022-12-08 DIAGNOSIS — E78.5 DYSLIPIDEMIA: ICD-10-CM

## 2022-12-08 DIAGNOSIS — E11.9 DIABETES MELLITUS TYPE 2, INSULIN DEPENDENT (HCC): Primary | ICD-10-CM

## 2022-12-08 DIAGNOSIS — D75.1 ERYTHROCYTOSIS: ICD-10-CM

## 2022-12-08 LAB — HBA1C MFR BLD HPLC: 6.9 %

## 2022-12-08 PROCEDURE — 83036 HEMOGLOBIN GLYCOSYLATED A1C: CPT | Performed by: INTERNAL MEDICINE

## 2022-12-08 PROCEDURE — G0463 HOSPITAL OUTPT CLINIC VISIT: HCPCS | Performed by: INTERNAL MEDICINE

## 2022-12-08 RX ORDER — SEMAGLUTIDE 1.34 MG/ML
1 INJECTION, SOLUTION SUBCUTANEOUS
Qty: 1 BOX | Refills: 5 | Status: SHIPPED | OUTPATIENT
Start: 2022-12-08

## 2022-12-08 NOTE — PATIENT INSTRUCTIONS
A1c is improved to 6.9%. Was 7.5 % 2 months ago. Goal to increase Ozempic to 1 mg weekly, keep taking Jardiance, and then stop insulin in February (sooner if getting low)  Look into mail order options with your new pharm plan. 90 days will cost less. [address] is back-ordered in places, but should improve soon!

## 2022-12-08 NOTE — PROGRESS NOTES
Room:  Identified pt with two pt identifiers(name and ). Reviewed record in preparation for visit and have obtained necessary documentation. Chief Complaint   Patient presents with    Follow-up    Diabetes        Vitals:    22 0834   BP: 130/86   Pulse: 83   Resp: 18   Temp: 98 °F (36.7 °C)   TempSrc: Oral   SpO2: 98%   Weight: 202 lb (91.6 kg)   Height: 6' (1.829 m)   PainSc:   0 - No pain       Health Maintenance Due   Topic    Eye Exam Retinal or Dilated     Shingrix Vaccine Age 50> (1 of 2)    COVID-19 Vaccine (4 - Booster for Clancy Peter series)    Flu Vaccine (1)       1. \"Have you been to the ER, urgent care clinic since your last visit? Hospitalized since your last visit? \" No    2. \"Have you seen or consulted any other health care providers outside of the [address] [place] [address] since your last visit? \" No     3. For patients over 45: Has the patient had a colonoscopy? Yes - no Care Gap present     If the patient is female:    4. For patients over 36: Has the patient had a mammogram? NA - based on age    11. For patients over 21: Has the patient had a pap smear? NA - based on age    Current Outpatient Medications   Medication Instructions    aspirin delayed-release 81 mg, Oral, DAILY    atorvastatin (LIPITOR) 10 mg tablet TAKE ONE TABLET BY MOUTH DAILY    Basaglar KwikPen U-100 Insulin 15 Units, SubCUTAneous, EVERY BEDTIME    Blood-Glucose Meter (Accu-Chek Guide Me Glucose Mtr) misc 1 Kit, Does Not Apply, DAILY    empagliflozin (Jardiance) 25 mg tablet TAKE ONE TABLET BY MOUTH DAILY    glucose blood VI test strips (Accu-Chek SmartView Test Strip) strip Test Fasting Blood Sugar Once Daily.  Dx E11.9    Insulin Needles, Disposable, (BD Jade 2nd Gen Pen Needle) 32 gauge x 5/32\" ndle 1 Pen Needle, Does Not Apply, DAILY    Insulin Needles, Disposable, (Jade Pen Needle) 32 gauge x 5/32\" ndle USE ONCE DAILY AS DIRECTED WITH INSULIN    lancets (ACCU-CHEK FASTCLIX LANCING DEV) misc Test Fasting Blood Sugar Once Daily    Ozempic 0.25 mg or 0.5 mg/dose (2 mg/1.5 ml) subq pen Start 0.25 mg weekly for 1 month and then increase to 0.5 mg weekly       No Known Allergies    Immunization History   Administered Date(s) Administered    COVID-19, PFIZER PURPLE top, DILUTE for use, (age 15 y+), IM, 30mcg/0.3mL 03/31/2021, 05/09/2021, 12/23/2021    Influenza Vaccine 10/01/2018, 10/20/2020    Influenza, FLUARIX, FLULAVAL, Laura Care (age 10 mo+) AND AFLURIA, (age 1 y+), PF, 0.5mL 11/13/2018, 01/10/2020    Pneumococcal Polysaccharide (PPSV-23) 01/10/2020    Tdap 11/13/2018, 12/12/2018       Past Medical History:   Diagnosis Date    COVID-19 01/28/2021    Diabetes mellitus type 2, controlled ([facility] 2015    Dyslipidemia 3/1/2018    Erythrocytosis 2019    JAK2 neg    Gout 06/2020    Mild hypertension 6/1/2018    Osteoarthrosis of knee     saw Dr. Shawna Liang 2019

## 2022-12-08 NOTE — PROGRESS NOTES
HISTORY OF PRESENT ILLNESS    Chief Complaint   Patient presents with    Follow-up    Diabetes       Presents for follow-up    Stress re: his wife who broke her hip and he is having to do house work. Ozempic costs $250. Is changing pharmacy plan into the new year, but still questions whether it is worth the investment to take this expensive medication. .        Seeing Dr. Berhane Stein for erythrocytosis. All tests neg. (Labs, abdominal ultrasound to image for solid organ mass)  Asked to consider sleep study. He does not walk up often. Wife says he snores and may stop breathing at night. He says he would not use CPAP. Diabetes Mellitus follow-up  Last hemoglobin a1c   Lab Results   Component Value Date/Time    Hemoglobin A1c 7.5 (H) 10/05/2022 01:09 PM    Hemoglobin A1c (POC) 5.9 07/08/2019 04:20 AM   medication compliance: compliant all of the time. Taking ozempic 0.5 mg weekly since 10/5/22. Also taking Basaglar insulin 15 units nightly and Jardiance 25 mg daily. Reports more gas on Ozempic. Diabetic diet compliance: compliant most of the time. Home glucose monitoring: fasting values range usually less than 130, but sometimes up to 145. Hypoglycemic episodes:  None. Further diabetic ROS: no polyuria or polydipsia, no chest pain, dyspnea or TIA's, no numbness, tingling or pain in extremities. Hyperlipidemia  Currently he takes lipitor 10 mg  ROS: taking medications as instructed, no medication side effects noted  No new myalgias, no joint pains, no weakness  No TIA's, no chest pain on exertion, no dyspnea on exertion, no swelling of ankles.    Lab Results   Component Value Date/Time    Cholesterol, total 160 05/05/2022 11:28 AM    HDL Cholesterol 49 05/05/2022 11:28 AM    LDL, calculated 73.8 05/05/2022 11:28 AM    VLDL, calculated 37.2 05/05/2022 11:28 AM    Triglyceride 186 (H) 05/05/2022 11:28 AM    CHOL/HDL Ratio 3.3 05/05/2022 11:28 AM         Review of Systems   All other systems reviewed and are negative, except as noted in HPI    Past Medical and Surgical History   has a past medical history of COVID-19 (01/28/2021), Diabetes mellitus type 2, controlled ([address] (2015), Dyslipidemia (3/1/2018), Erythrocytosis (2019), Gout (06/2020), Mild hypertension (6/1/2018), and Osteoarthrosis of knee. has a past surgical history that includes hx hernia repair; hx wisdom teeth extraction; and hx colonoscopy (02/18/2020). reports that he has never smoked. His smokeless tobacco use includes snuff. He reports that he does not currently use alcohol. He reports that he does not use drugs. family history includes Cancer in his mother; Diabetes in his father; Hypertension in his brother, father, and sister; No Known Problems in his daughter, daughter, and son. Physical Exam   Nursing note and vitals reviewed. Blood pressure 130/86, pulse 83, temperature 98 °F (36.7 °C), temperature source Oral, resp. rate 18, height 6' (1.829 m), weight 202 lb (91.6 kg), SpO2 98 %. Constitutional:  No distress. Eyes: Conjunctivae are normal.   Ears:  Hearing grossly intact  Cardiovascular: Normal rate. regular rhythm, no murmurs or gallops  No edema  Pulmonary/Chest: Effort normal.   CTAB  Musculoskeletal: moves all 4 extremities   Neurological: Alert and oriented to person, place, and time. Skin: No visible rash noted. Psychiatric: Normal mood and affect. Behavior is normal.     Assessment and Plan    Diagnoses and all orders for this visit:    1. Diabetes mellitus type 2, insulin dependent ([address]  Controlled now. Improving A1c after 2 months of Ozempic. Recommend to continue if he can afford and ideally will increase to 1 mg. Availability of medication may be an issue in the next month, but I am reassured it should improve after that. His insurance plan also will change in the new year and I have advised that he look into mail order options where he can get medications like this for last cost per month. Ideally, I would like him to titrate up Ozempic and wean off of insulin by February. Continue Jardiance. Continue weight loss and low carbohydrate diet. Follow-up in 3 months.  -     Ozempic 1 mg/dose (2 mg/1.5 mL) sub-q pen; 1 mg by SubCUTAneous route every seven (7) days. Increased 12/8/22  Indications: type 2 diabetes mellitus  -     AMB POC HEMOGLOBIN A1C    2. Dyslipidemia  This condition is controlled by medication therapy with Lipitor. Refilled medications    3. Erythrocytosis  No alarm features or primary causes found. May be secondary to untreated and undiagnosed obstructive sleep apnea. Defers further evaluation for now. Will follow CBC every 6 months. lab results and schedule of future lab studies reviewed with patient  reviewed medications and side effects in detail    Return to clinic for further evaluation if new symptoms develop      Current Outpatient Medications   Medication Sig    Ozempic 1 mg/dose (2 mg/1.5 mL) sub-q pen 1 mg by SubCUTAneous route every seven (7) days. Increased 12/8/22  Indications: type 2 diabetes mellitus    glucose blood VI test strips (Accu-Chek SmartView Test Strip) strip Test Fasting Blood Sugar Once Daily. Dx E11.9    aspirin delayed-release 81 mg tablet Take 1 Tablet by mouth daily. Blood-Glucose Meter (Accu-Chek Guide Me Glucose Mtr) misc 1 Kit by Does Not Apply route daily. atorvastatin (LIPITOR) 10 mg tablet TAKE ONE TABLET BY MOUTH DAILY    Insulin Needles, Disposable, (BD Jade 2nd Gen Pen Needle) 32 gauge x 5/32\" ndle 1 Pen Needle by Does Not Apply route daily. Insulin Needles, Disposable, (Jade Pen Needle) 32 gauge x 5/32\" ndle USE ONCE DAILY AS DIRECTED WITH INSULIN    insulin glargine (Basaglar KwikPen U-100 Insulin) 100 unit/mL (3 mL) inpn 15 Units by SubCUTAneous route nightly.     empagliflozin (Jardiance) 25 mg tablet TAKE ONE TABLET BY MOUTH DAILY    lancets (ACCU-CHEK FASTCLIX LANCING DEV) misc Test Fasting Blood Sugar Once Daily     No current facility-administered medications for this visit.

## 2022-12-09 ENCOUNTER — DOCUMENTATION ONLY (OUTPATIENT)
Dept: INTERNAL MEDICINE CLINIC | Age: 67
End: 2022-12-09

## 2022-12-09 NOTE — PROGRESS NOTES
PA completed through covermymeds on patient's Testosterone 20.25 MG/ACT(1.62%) gel and it was approved on 12/08. Jessie Dave, Case ID: [id number], Rx #: [id number].

## 2022-12-14 NOTE — PROGRESS NOTES
[facility] at [address], [facility] [address] Rank: [phone]  F: [phone] Patient ID  Name: Monalisa Duval  YOB: 1955  MRN: [id number]  Referring Provider:   No referring provider defined for this encounter. Primary Care Provider:   Alma Rosa Kaminski MD       HEMATOLOGY/MEDICAL ONCOLOGY  NOTE   Date of Visit: 12/13/22   Reason for Evaluation:     Chief Complaint   Patient presents with    Follow-up     Erythrocytosis     Subjective:     History of Present Illness:   Monalisa Duval is a 78 y.o. M who presents for a follow-up evaluation for an elevated RBC. Patient continues to dip tobacco.  He denies any shortness of breath. Denies any daytime sleepiness. Reports that his wife says that he snores. Appetite:Grade 1  Constipation:Grade 1     Patient overall reports feeling stable. Past Medical History:   Diagnosis Date    COVID-19 01/28/2021    Diabetes mellitus type 2, controlled ([address] 2015    Dyslipidemia 03/01/2018    Erythrocytosis 2019    JAK2 neg. Dr. Nathalie Dee    Gout 06/2020    Mild hypertension 06/01/2018    Osteoarthrosis of knee     saw Dr. Carolina Ahmadi 2019      Past Surgical History:   Procedure Laterality Date    HX COLONOSCOPY  02/18/2020    tubular adenoma. repeat 5 years. Dr. Melvin Vidal.   (report received 5/17/21)    HX HERNIA REPAIR      R inguinal    HX WISDOM TEETH EXTRACTION        Social History     Tobacco Use    Smoking status: Never    Smokeless tobacco: Current     Types: Snuff    Tobacco comments:     dip/everyday    Substance Use Topics    Alcohol use: Not Currently     Alcohol/week: 0.0 standard drinks      Family History   Problem Relation Age of Onset    Diabetes Father     Hypertension Father     Cancer Mother         breast/lung    Hypertension Sister     Hypertension Brother     No Known Problems Daughter     No Known Problems Daughter     No Known Problems Son     Elevated Lipids Neg Hx      Current Outpatient Medications   Medication Sig    Ozempic 1 mg/dose (2 mg/1.5 mL) sub-q pen 1 mg by SubCUTAneous route every seven (7) days. Increased 12/8/22  Indications: type 2 diabetes mellitus    glucose blood VI test strips (Accu-Chek SmartView Test Strip) strip Test Fasting Blood Sugar Once Daily. Dx E11.9    aspirin delayed-release 81 mg tablet Take 1 Tablet by mouth daily. Blood-Glucose Meter (Accu-Chek Guide Me Glucose Mtr) misc 1 Kit by Does Not Apply route daily. atorvastatin (LIPITOR) 10 mg tablet TAKE ONE TABLET BY MOUTH DAILY    Insulin Needles, Disposable, (BD Jade 2nd Gen Pen Needle) 32 gauge x 5/32\" ndle 1 Pen Needle by Does Not Apply route daily. Insulin Needles, Disposable, (Jade Pen Needle) 32 gauge x 5/32\" ndle USE ONCE DAILY AS DIRECTED WITH INSULIN    insulin glargine (Basaglar KwikPen U-100 Insulin) 100 unit/mL (3 mL) inpn 15 Units by SubCUTAneous route nightly. empagliflozin (Jardiance) 25 mg tablet TAKE ONE TABLET BY MOUTH DAILY    lancets (ACCU-CHEK FASTCLIX LANCING DEV) misc Test Fasting Blood Sugar Once Daily     No current facility-administered medications for this visit. No Known Allergies   -  Review of Systems Provided by:  Patient  Review of Systems: A complete review of systems was obtained, reviewed. Pertinent findings reviewed above. Objective:   Visit Vitals  /88 (BP 1 Location: Left upper arm, BP Patient Position: Sitting, BP Cuff Size: Large adult)   Pulse 89   Temp 98.5 °F (36.9 °C) (Oral)   Resp 18   Ht 6' (1.829 m)   Wt 200 lb 12.8 oz (91.1 kg)   SpO2 94%   BMI 27.23 kg/m²       ECOG PS: 0- Fully active, able to carry on all pre-disease performance without restriction. Physical Exam  Constitutional: No acute distress. and Non-toxic appearance. HENT: Normocephalic and atraumatic head. Eyes: Normal Conjunctivae. Anicteric sclerae. Cardiovascular: S1,S2 auscultated. No pitting edema. Pulmonary: Normal Respiratory Effort. No wheezing. No rhonchi.    Abdominal: Normal bowel sounds. Soft Abdomen to palpation. No abdominal tenderness. Skin: No jaundice. No rash. Musculoskeletal: No temporal muscle wasting on gross inspection. No myalgias on palpation. Neurological: Alert and oriented. No tremor on inspection. Normal Gait. Psychiatric: mood normal. normal speech rate. normal affect. Results:     I personally reviewed Epic EHR labs/results below:  Lab Results   Component Value Date/Time    WBC 8.3 11/17/2022 12:46 PM    HGB 17.9 (H) 11/17/2022 12:46 PM    HCT 52.8 (H) 11/17/2022 12:46 PM    PLATELET 893 63/51/8316 12:46 PM    MCV 88.6 11/17/2022 12:46 PM    ABS. NEUTROPHILS 5.0 11/17/2022 12:46 PM     Lab Results   Component Value Date/Time    Sodium 142 10/05/2022 01:09 PM    Potassium 4.5 10/05/2022 01:09 PM    Chloride 108 10/05/2022 01:09 PM    CO2 30 10/05/2022 01:09 PM    Glucose 129 (H) 10/05/2022 01:09 PM    BUN 17 10/05/2022 01:09 PM    Creatinine 1.27 10/05/2022 01:09 PM    GFR est AA >60 05/05/2022 11:28 AM    GFR est non-AA >60 05/05/2022 11:28 AM    Calcium 9.4 10/05/2022 01:09 PM    Glucose  10/09/2015 09:30 AM     Lab Results   Component Value Date/Time    Bilirubin, total 1.0 10/05/2022 01:09 PM    ALT (SGPT) 30 10/05/2022 01:09 PM    Alk. phosphatase 85 10/05/2022 01:09 PM    Protein, total 7.3 10/05/2022 01:09 PM    Albumin 4.0 10/05/2022 01:09 PM    Globulin 3.3 10/05/2022 01:09 PM     Office Visit on 12/08/2022   Component Date Value Ref Range Status    Hemoglobin A1c (POC) 12/08/2022 6.9  % Final   Orders Only on 11/17/2022   Component Date Value Ref Range Status    Erythropoietin 11/17/2022 4.6  2.6 - 18.5 mIU/mL Final    Comment: (NOTE)  [facility]iel [facility] [employer] [facility] [address] [place] obtained with different assay methods or kits cannot be used  interchangeably. Results cannot be interpreted as absolute evidence  of the presence or absence of malignant disease.   Performed At: [facility]  [address] [place] [id number]  Sarah Tomlinson MD [id number]      Iron 11/17/2022 88  35 - 150 ug/dL Final    TIBC 11/17/2022 317  250 - 450 ug/dL Final    Iron % saturation 11/17/2022 28  20 - 50 % Final    PERIPHERAL SMEAR  11/17/2022  Pathologic examination results can be viewed in [facility] Care Chart Review under the Pathology tab. Final  Blood, smear:        Normochromic normocytic erythrocytosis. Leukocytes are normal in number and morphology. Platelets unremarkable.     WBC 11/17/2022 8.3  4.1 - 11.1 K/uL Final    RBC 11/17/2022 5.96 (A)  4.10 - 5.70 M/uL Final    HGB 11/17/2022 17.9 (A)  12.1 - 17.0 g/dL Final    HCT 11/17/2022 52.8 (A)  36.6 - 50.3 % Final    MCV 11/17/2022 88.6  80.0 - 99.0 FL Final    MCH 11/17/2022 30.0  26.0 - 34.0 PG Final    MCHC 11/17/2022 33.9  30.0 - 36.5 g/dL Final    RDW 11/17/2022 12.3  11.5 - 14.5 % Final    PLATELET 68/44/5504 161  150 - 400 K/uL Final    MPV 11/17/2022 10.8  8.9 - 12.9 FL Final    NRBC 11/17/2022 0.0  0  WBC Final    ABSOLUTE NRBC 11/17/2022 0.00  0.00 - 0.01 K/uL Final    NEUTROPHILS 11/17/2022 61  32 - 75 % Final    LYMPHOCYTES 11/17/2022 25  12 - 49 % Final    MONOCYTES 11/17/2022 10  5 - 13 % Final    EOSINOPHILS 11/17/2022 3  0 - 7 % Final    BASOPHILS 11/17/2022 1  0 - 1 % Final    IMMATURE GRANULOCYTES 11/17/2022 0  0.0 - 0.5 % Final    ABS. NEUTROPHILS 11/17/2022 5.0  1.8 - 8.0 K/UL Final    ABS. LYMPHOCYTES 11/17/2022 2.1  0.8 - 3.5 K/UL Final    ABS. MONOCYTES 11/17/2022 0.8  0.0 - 1.0 K/UL Final    ABS. EOSINOPHILS 11/17/2022 0.2  0.0 - 0.4 K/UL Final    ABS. BASOPHILS 11/17/2022 0.1  0.0 - 0.1 K/UL Final    ABS. IMM. GRANS.  11/17/2022 0.0  0.00 - 0.04 K/UL Final    DF 11/17/2022 AUTOMATED    Final    Ferritin 11/17/2022 267  26 - 388 NG/ML Final   Office Visit on 11/17/2022   Component Date Value Ref Range Status    JAK2 Mutation Analysis 11/17/2022 Comment   Final    Comment: Result: NEGATIVE for the JAK2 V617F mutation. Interpretation:  The G to T nucleotide change encoding the V617F  mutation was not detected. This result does not rule out the presence  of the JAK2 mutation at a level below the sensitivity of detection of  this assay, or the presence of other mutations within JAK2 not  detected by this assay. This result does not rule out a diagnosis of  polycythemia vera, essential thrombocythemia or idiopathic  myelofibrosis as the V617F mutation is not detected in all patients  with these disorders. Extraction 11/17/2022 Completed   Final    CALR MUTATION DETECTION RESULT 11/17/2022 Comment   Final    Comment: NEGATIVE  No insertions or deletions were detected within the analyzed region  of the calreticulin (CALR) gene. A negative result does not entirely exclude the possibility of a  clonal population carrying CALR gene mutations that are not covered  by this assay. Results should be interpreted in conjunction with  clinical and laboratory findings for the most accurate interpretation. JAK2 Exons 12-15 Mut Det PCR 11/17/2022 Comment   Final    Comment: NEGATIVE  JAK2 mutations were not detected in exons 12, 13, 14 and 15. This  result does not rule out the presence of JAK2 mutation at a level  below the detection sensitivity of this assay, the presence of  other mutations outside the analyzed region of the JAK2 gene, or  the presence of a myeloproliferative or other neoplasm. Result must  be correlated with other clinical data for the most accurate  diagnosis. MPL MUTATION ANALYSIS RESULT 11/17/2022 Comment   Final    Comment: No MPL mutation was identified in the provided specimen of this  individual. Results should be interpreted in conjunction with  clinical and other laboratory findings for the most accurate  interpretation.            XR CHEST PA LAT: 11/17/2022 Clinical history: Erythrocytosis INDICATION:   Erythrocytosis COMPARISON: 5/4/2021 FINDINGS: PA and lateral views of the chest are obtained. The cardiopericardial silhouette is within normal limits. There is no pleural effusion, pneumothorax or focal consolidation present. No acute intrathoracic disease. US ABD COMP 11/18/2022 Mildly increased hepatic echogenicity is compatible with hepatic parenchymal disease, likely mild hepatic steatosis. No visualized hepatic mass. Assessment and Recommendations:      1. Erythrocytosis  -Hemoglobin continues to remain elevated. Myeloproliferative work-up per blood testing negative (JAK2 V617F and exons 12-15, CALR, MPL). If Hematocrit would escalate with an upward trend then would recommend referral back to our clinic. Also, discussed with Jonathan Huff to follow-up with Dr. Han Peralta to discuss possible sleep study to evaluate for sleep apnea. Reviewed Dr. Nola Hendrix note from 12/8 and he addressed it already with Jonathan Huff but patient deferring at this time. I counseled Jonathan Huff that he should stop dipping tobacco and offered quit assistance but he has been doing this for decades and is not ready/contemplating to quit. 2. Nonalcoholic fatty liver disease  Patient can follow-up with Dr. Han Peralta regarding any referral to Hepatology about his ultrasound finding of likely mild hepatic steatosis. He denies excessive alcohol use. He denies any history of obesity though currently is overweight. We also discussed that diabetes be a risk factor for hepatic steatosis. He has diabetes. I reviewed his HgbA1c and it seems that he has had at least good recent control of his diabetes. Follow-up and Dispositions    Return if symptoms worsen or fail to improve.        Gurjit Sarah MD  Hematology/Medical Oncology Provider  [facility]  P: [phone]    Signed By:   Becky Jalloh MD

## 2022-12-15 ENCOUNTER — OFFICE VISIT (OUTPATIENT)
Dept: ONCOLOGY | Age: 67
End: 2022-12-15
Payer: MEDICARE

## 2022-12-15 VITALS
HEIGHT: 72 IN | BODY MASS INDEX: 27.2 KG/M2 | WEIGHT: 200.8 LBS | OXYGEN SATURATION: 94 % | TEMPERATURE: 98.5 F | HEART RATE: 89 BPM | DIASTOLIC BLOOD PRESSURE: 88 MMHG | SYSTOLIC BLOOD PRESSURE: 128 MMHG | RESPIRATION RATE: 18 BRPM

## 2022-12-15 DIAGNOSIS — D75.1 ERYTHROCYTOSIS: Primary | ICD-10-CM

## 2022-12-15 DIAGNOSIS — K76.0 NONALCOHOLIC FATTY LIVER DISEASE: ICD-10-CM

## 2022-12-15 PROCEDURE — G0463 HOSPITAL OUTPT CLINIC VISIT: HCPCS | Performed by: INTERNAL MEDICINE

## 2022-12-15 NOTE — PROGRESS NOTES
1. Have you been to the ER, urgent care clinic since your last visit? Hospitalized since your last visit? No    2. Have you seen or consulted any other health care providers outside of the [address] [place] [address] since your last visit? Include any pap smears or colon screening.  No        Visit Vitals  /88 (BP 1 Location: Left upper arm, BP Patient Position: Sitting, BP Cuff Size: Large adult)   Pulse 89   Temp 98.5 °F (36.9 °C) (Oral)   Resp 18   Ht 6' (1.829 m)   Wt 200 lb 12.8 oz (91.1 kg)   SpO2 94%   BMI 27.23 kg/m²            Chief Complaint   Patient presents with    Follow-up     Erythrocytosis

## 2022-12-15 NOTE — LETTER
12/15/2022    Patient: Nori Wynn   YOB: 1955   Date of Visit: 12/15/2022     Yumi Smith, [address]  Via In Basket    Dear Yumi Smith MD,      Thank you for referring Mr. Jaiden Garay to [email or web address] for evaluation. My notes for this consultation are attached. If you have questions, please do not hesitate to call me. I look forward to following your patient along with you.       Sincerely,    Diane Gillespie MD

## 2023-01-30 DIAGNOSIS — E78.5 DYSLIPIDEMIA: ICD-10-CM

## 2023-01-30 RX ORDER — ATORVASTATIN CALCIUM 10 MG/1
TABLET, FILM COATED ORAL
Qty: 90 TABLET | Refills: 1 | Status: SHIPPED | OUTPATIENT
Start: 2023-01-30

## 2023-03-15 ENCOUNTER — OFFICE VISIT (OUTPATIENT)
Dept: INTERNAL MEDICINE CLINIC | Age: 68
End: 2023-03-15
Payer: MEDICARE

## 2023-03-15 VITALS
BODY MASS INDEX: 27.14 KG/M2 | WEIGHT: 200.4 LBS | HEIGHT: 72 IN | TEMPERATURE: 97.9 F | SYSTOLIC BLOOD PRESSURE: 132 MMHG | RESPIRATION RATE: 17 BRPM | OXYGEN SATURATION: 92 % | HEART RATE: 67 BPM | DIASTOLIC BLOOD PRESSURE: 70 MMHG

## 2023-03-15 DIAGNOSIS — D75.1 ERYTHROCYTOSIS: ICD-10-CM

## 2023-03-15 DIAGNOSIS — E78.5 DYSLIPIDEMIA: ICD-10-CM

## 2023-03-15 DIAGNOSIS — Z79.4 DIABETES MELLITUS TYPE 2, INSULIN DEPENDENT (HCC): Primary | ICD-10-CM

## 2023-03-15 DIAGNOSIS — E11.9 DIABETES MELLITUS TYPE 2, INSULIN DEPENDENT (HCC): Primary | ICD-10-CM

## 2023-03-15 DIAGNOSIS — R03.0 BORDERLINE SYSTOLIC HTN: ICD-10-CM

## 2023-03-15 LAB — HBA1C MFR BLD HPLC: 6.7 %

## 2023-03-15 PROCEDURE — 3044F HG A1C LEVEL LT 7.0%: CPT | Performed by: INTERNAL MEDICINE

## 2023-03-15 PROCEDURE — G8427 DOCREV CUR MEDS BY ELIG CLIN: HCPCS | Performed by: INTERNAL MEDICINE

## 2023-03-15 PROCEDURE — 83036 HEMOGLOBIN GLYCOSYLATED A1C: CPT | Performed by: INTERNAL MEDICINE

## 2023-03-15 PROCEDURE — G8536 NO DOC ELDER MAL SCRN: HCPCS | Performed by: INTERNAL MEDICINE

## 2023-03-15 PROCEDURE — 2022F DILAT RTA XM EVC RTNOPTHY: CPT | Performed by: INTERNAL MEDICINE

## 2023-03-15 PROCEDURE — 3017F COLORECTAL CA SCREEN DOC REV: CPT | Performed by: INTERNAL MEDICINE

## 2023-03-15 PROCEDURE — G0463 HOSPITAL OUTPT CLINIC VISIT: HCPCS | Performed by: INTERNAL MEDICINE

## 2023-03-15 PROCEDURE — G8510 SCR DEP NEG, NO PLAN REQD: HCPCS | Performed by: INTERNAL MEDICINE

## 2023-03-15 PROCEDURE — 1101F PT FALLS ASSESS-DOCD LE1/YR: CPT | Performed by: INTERNAL MEDICINE

## 2023-03-15 PROCEDURE — G8417 CALC BMI ABV UP PARAM F/U: HCPCS | Performed by: INTERNAL MEDICINE

## 2023-03-15 PROCEDURE — 99213 OFFICE O/P EST LOW 20 MIN: CPT | Performed by: INTERNAL MEDICINE

## 2023-03-15 RX ORDER — INSULIN GLARGINE 100 [IU]/ML
15 INJECTION, SOLUTION SUBCUTANEOUS
Qty: 5 ADJUSTABLE DOSE PRE-FILLED PEN SYRINGE | Refills: 5
Start: 2023-03-15

## 2023-03-15 NOTE — PROGRESS NOTES
HISTORY OF PRESENT ILLNESS    Chief Complaint   Patient presents with    Diabetes     Stopped checking glucose for a year because it was the same number. Hypertension    Medication Evaluation     Unable to get Ozempic. Presents for follow-up    Diabetes Mellitus follow-up  Last hemoglobin a1c   Lab Results   Component Value Date/Time    Hemoglobin A1c 7.5 (H) 10/05/2022 01:09 PM    Hemoglobin A1c (POC) 6.7 03/15/2023 09:36 AM   medication compliance:   Not taking Ozempic for 4 month since not available and then was expensive  Taking Basaglar insulin 15 units HS , but ONLY IF AM FBS > 130 (takes insulin about 1/2 of days)  Taking Jardiance 25 mg daily. Diabetic diet compliance: compliant most of the time. Home glucose monitoring: fasting values range usually less than 130, but sometimes up to 145. Hypoglycemic episodes:  None. Further diabetic ROS: no polyuria or polydipsia, no chest pain, dyspnea or TIA's, no numbness, tingling or pain in extremities. Wt Readings from Last 3 Encounters:   03/15/23 200 lb 6.4 oz (90.9 kg)   12/15/22 200 lb 12.8 oz (91.1 kg)   12/08/22 202 lb (91.6 kg)     Hyperlipidemia  Currently he takes lipitor 10 mg  ROS: taking medications as instructed, no medication side effects noted  No new myalgias, no joint pains, no weakness  No TIA's, no chest pain on exertion, no dyspnea on exertion, no swelling of ankles. Lab Results   Component Value Date/Time    Cholesterol, total 160 05/05/2022 11:28 AM    HDL Cholesterol 49 05/05/2022 11:28 AM    LDL, calculated 73.8 05/05/2022 11:28 AM    VLDL, calculated 37.2 05/05/2022 11:28 AM    Triglyceride 186 (H) 05/05/2022 11:28 AM    CHOL/HDL Ratio 3.3 05/05/2022 11:28 AM       Erythrocytosis followed up with hematology on December 15. Myeloproliferative blood work negative. Recommend to consider sleep study. Recommended to stop dipping tobacco.  Consider consultation with hepatology for nonalcoholic fatty liver disease.   Lab Results   Component Value Date/Time    WBC 8.3 11/17/2022 12:46 PM    HGB 17.9 (H) 11/17/2022 12:46 PM    HCT 52.8 (H) 11/17/2022 12:46 PM    PLATELET 469 26/99/8794 12:46 PM    MCV 88.6 11/17/2022 12:46 PM         Review of Systems   All other systems reviewed and are negative, except as noted in HPI    Past Medical and Surgical History   has a past medical history of COVID-19 (01/28/2021), Diabetes mellitus type 2, controlled ([facility] [address] (2015), Dyslipidemia (03/01/2018), Erythrocytosis (2019), Gout (06/2020), Mild hypertension (06/01/2018), and Osteoarthrosis of knee. has a past surgical history that includes hx hernia repair; hx wisdom teeth extraction; and hx colonoscopy (02/18/2020). reports that he has never smoked. His smokeless tobacco use includes snuff. He reports that he does not currently use alcohol. He reports that he does not use drugs. family history includes Cancer in his mother; Diabetes in his father; Hypertension in his brother, father, and sister; No Known Problems in his daughter, daughter, and son. Physical Exam   Nursing note and vitals reviewed. Blood pressure 132/70, pulse 67, temperature 97.9 °F (36.6 °C), temperature source Oral, resp. rate 17, height 6' (1.829 m), weight 200 lb 6.4 oz (90.9 kg), SpO2 92 %. Constitutional:  No distress. Eyes: Conjunctivae are normal.   Ears:  Hearing grossly intact  Cardiovascular: Normal rate. regular rhythm, no murmurs or gallops  No edema  Pulmonary/Chest: Effort normal.   CTAB  Musculoskeletal: moves all 4 extremities   Neurological: Alert and oriented to person, place, and time. Skin: No visible rash noted. Psychiatric: Normal mood and affect. Behavior is normal.     Diagnoses and all orders for this visit:    1. Diabetes mellitus type 2, insulin dependent (HCC)  A1c is well controlled now, but I am concerned about his risk of fluctuations in glucoses with only taking his insulin as needed.   Recommend trying to find a stable dose of insulin and continuing Jardiance. Congratulated for his improved diabetic control. Follow-up in 6 months, sooner if glucoses are abnormal.  Monitor blood pressure and consider starting ACE inhibitor if borderline  -     AMB POC HEMOGLOBIN A1C  -     insulin glargine (Basaglar KwikPen U-100 Insulin) 100 unit/mL (3 mL) inpn; 15 Units by SubCUTAneous route nightly. (Reduce to 12 units if fasting am glucoses is less than 80)  -     METABOLIC PANEL, BASIC; Future    2. Dyslipidemia  Likely well-controlled on current medication with atorvastatin 20 mg daily. Repeat labs and adjust.  Target LDL at least less than 100.  -     LIPID PANEL; Future    3. Erythrocytosis  Myeloproliferative work-up negative. Sleep study should be considered at some point. Will defer for now. Agree with stopping any tobacco use at all. Will refer back to hematology clinic if any concerning findings. -     CBC WITH AUTOMATED DIFF; Future      lab results and schedule of future lab studies reviewed with patient  reviewed medications and side effects in detail    Return to clinic for further evaluation if new symptoms develop      Current Outpatient Medications   Medication Sig    insulin glargine (Basaglar KwikPen U-100 Insulin) 100 unit/mL (3 mL) inpn 15 Units by SubCUTAneous route nightly. (Reduce to 12 units if fasting am glucoses is less than 80)    atorvastatin (LIPITOR) 10 mg tablet TAKE ONE TABLET BY MOUTH DAILY    glucose blood VI test strips (Accu-Chek SmartView Test Strip) strip Test Fasting Blood Sugar Once Daily. Dx E11.9    aspirin delayed-release 81 mg tablet Take 1 Tablet by mouth daily. Blood-Glucose Meter (Accu-Chek Guide Me Glucose Mtr) misc 1 Kit by Does Not Apply route daily. Insulin Needles, Disposable, (BD Jade 2nd Gen Pen Needle) 32 gauge x 5/32\" ndle 1 Pen Needle by Does Not Apply route daily.     Insulin Needles, Disposable, (Jade Pen Needle) 32 gauge x 5/32\" ndle USE ONCE DAILY AS DIRECTED WITH INSULIN empagliflozin (Jardiance) 25 mg tablet TAKE ONE TABLET BY MOUTH DAILY    lancets (ACCU-CHEK FASTCLIX LANCING DEV) misc Test Fasting Blood Sugar Once Daily     No current facility-administered medications for this visit.

## 2023-05-12 DIAGNOSIS — E11.9 TYPE 2 DIABETES MELLITUS WITHOUT COMPLICATION, WITH LONG-TERM CURRENT USE OF INSULIN (HCC): Primary | ICD-10-CM

## 2023-05-12 DIAGNOSIS — Z79.4 TYPE 2 DIABETES MELLITUS WITHOUT COMPLICATION, WITH LONG-TERM CURRENT USE OF INSULIN (HCC): Primary | ICD-10-CM

## 2023-05-12 RX ORDER — INSULIN GLARGINE 100 [IU]/ML
INJECTION, SOLUTION SUBCUTANEOUS
Qty: 5 ADJUSTABLE DOSE PRE-FILLED PEN SYRINGE | Refills: 5 | Status: SHIPPED | OUTPATIENT
Start: 2023-05-12

## 2023-06-10 ENCOUNTER — OFFICE VISIT (OUTPATIENT)
Age: 68
End: 2023-06-10

## 2023-06-10 VITALS
BODY MASS INDEX: 26.55 KG/M2 | RESPIRATION RATE: 18 BRPM | TEMPERATURE: 101 F | HEART RATE: 99 BPM | DIASTOLIC BLOOD PRESSURE: 87 MMHG | WEIGHT: 196 LBS | OXYGEN SATURATION: 97 % | SYSTOLIC BLOOD PRESSURE: 135 MMHG | HEIGHT: 72 IN

## 2023-06-10 DIAGNOSIS — U07.1 COVID: Primary | ICD-10-CM

## 2023-06-10 DIAGNOSIS — R05.9 COUGH, UNSPECIFIED TYPE: ICD-10-CM

## 2023-06-10 DIAGNOSIS — R07.0 THROAT PAIN: ICD-10-CM

## 2023-06-10 LAB
Lab: ABNORMAL
QC PASS/FAIL: ABNORMAL
SARS-COV-2, POC: DETECTED
STREP PYOGENES DNA, POC: NEGATIVE
VALID INTERNAL CONTROL, POC: NORMAL

## 2023-06-10 RX ORDER — BENZONATATE 200 MG/1
200 CAPSULE ORAL 3 TIMES DAILY PRN
Qty: 30 CAPSULE | Refills: 0 | Status: SHIPPED | OUTPATIENT
Start: 2023-06-10 | End: 2023-06-20

## 2023-06-10 ASSESSMENT — ENCOUNTER SYMPTOMS
VOMITING: 0
CHEST TIGHTNESS: 0
NAUSEA: 0
SORE THROAT: 1
ABDOMINAL PAIN: 0
RHINORRHEA: 1
SHORTNESS OF BREATH: 0
COUGH: 1

## 2023-06-10 NOTE — PROGRESS NOTES
status:      Spouse name: None    Number of children: None    Years of education: None    Highest education level: None   Tobacco Use    Smoking status: Never    Smokeless tobacco: Current   Substance and Sexual Activity    Alcohol use: Not Currently    Drug use: Never          Current Outpatient Medications   Medication Sig    nirmatrelvir/ritonavir 300/100 (PAXLOVID) 20 x 150 MG & 10 x 100MG TBPK Take 3 tablets (two 150 mg nirmatrelvir and one 100 mg ritonavir tablets) by mouth every 12 hours for 5 days. benzonatate (TESSALON) 200 MG capsule Take 1 capsule by mouth 3 times daily as needed for Cough    insulin glargine (BASAGLAR KWIKPEN) 100 UNIT/ML injection pen INJECT 15 UNITS UNDER THE SKIN NIGHTLY    aspirin 81 MG EC tablet Take by mouth daily    atorvastatin (LIPITOR) 10 MG tablet TAKE ONE TABLET BY MOUTH DAILY    empagliflozin (JARDIANCE) 25 MG tablet TAKE ONE TABLET BY MOUTH DAILY    Semaglutide, 1 MG/DOSE, (OZEMPIC, 1 MG/DOSE,) 2 MG/1.5ML SOPN Inject into the skin every 7 days     No current facility-administered medications for this visit. Objective:     Vitals:    06/10/23 1141   BP: 135/87   Site: Right Upper Arm   Position: Sitting   Cuff Size: Large Adult   Pulse: 99   Resp: 18   Temp: (!) 101 °F (38.3 °C)   TempSrc: Oral   SpO2: 97%   Weight: 196 lb (88.9 kg)   Height: 6' (1.829 m)       Physical Exam  Vitals and nursing note reviewed. Constitutional:       General: He is not in acute distress. Appearance: Normal appearance. He is not ill-appearing or toxic-appearing. HENT:      Head: Normocephalic and atraumatic. Ears:      Comments: BL TM pearly gray, no erythema, no effusion, no bulging  Tonsils 2+BL, no erythema, no exudate, uvula midline. Overall good dentition. No visible nasal congestion. No obvious palpable lymphadenopathy. Eyes:      Extraocular Movements: Extraocular movements intact.       Conjunctiva/sclera: Conjunctivae normal.   Cardiovascular:      Rate

## 2023-06-11 LAB
BACTERIA SPEC CULT: NORMAL
SERVICE CMNT-IMP: NORMAL

## 2023-06-24 DIAGNOSIS — Z79.4 TYPE 2 DIABETES MELLITUS WITHOUT COMPLICATION, WITH LONG-TERM CURRENT USE OF INSULIN (HCC): Primary | ICD-10-CM

## 2023-06-24 DIAGNOSIS — E11.9 TYPE 2 DIABETES MELLITUS WITHOUT COMPLICATION, WITH LONG-TERM CURRENT USE OF INSULIN (HCC): Primary | ICD-10-CM

## 2023-06-26 RX ORDER — EMPAGLIFLOZIN 25 MG/1
TABLET, FILM COATED ORAL
Qty: 90 TABLET | Refills: 3 | Status: SHIPPED | OUTPATIENT
Start: 2023-06-26

## 2023-07-31 RX ORDER — ATORVASTATIN CALCIUM 10 MG/1
TABLET, FILM COATED ORAL
Qty: 90 TABLET | Refills: 1 | Status: SHIPPED | OUTPATIENT
Start: 2023-07-31

## 2023-09-20 ENCOUNTER — OFFICE VISIT (OUTPATIENT)
Age: 68
End: 2023-09-20
Payer: MEDICARE

## 2023-09-20 VITALS
SYSTOLIC BLOOD PRESSURE: 126 MMHG | OXYGEN SATURATION: 98 % | BODY MASS INDEX: 26.95 KG/M2 | RESPIRATION RATE: 18 BRPM | TEMPERATURE: 98.4 F | DIASTOLIC BLOOD PRESSURE: 86 MMHG | HEART RATE: 74 BPM | WEIGHT: 199 LBS | HEIGHT: 72 IN

## 2023-09-20 DIAGNOSIS — D75.1 ERYTHROCYTOSIS: ICD-10-CM

## 2023-09-20 DIAGNOSIS — E78.5 DYSLIPIDEMIA: ICD-10-CM

## 2023-09-20 DIAGNOSIS — M10.9 GOUT, UNSPECIFIED CAUSE, UNSPECIFIED CHRONICITY, UNSPECIFIED SITE: ICD-10-CM

## 2023-09-20 DIAGNOSIS — Z87.898 HISTORY OF PERSISTENT COUGH: ICD-10-CM

## 2023-09-20 DIAGNOSIS — Z12.5 SCREENING FOR PROSTATE CANCER: ICD-10-CM

## 2023-09-20 DIAGNOSIS — R03.0 BORDERLINE SYSTOLIC HTN: ICD-10-CM

## 2023-09-20 DIAGNOSIS — E11.649 HYPOGLYCEMIA ASSOCIATED WITH TYPE 2 DIABETES MELLITUS (HCC): ICD-10-CM

## 2023-09-20 DIAGNOSIS — E11.9 TYPE 2 DIABETES MELLITUS WITHOUT COMPLICATION, WITH LONG-TERM CURRENT USE OF INSULIN (HCC): ICD-10-CM

## 2023-09-20 DIAGNOSIS — Z79.4 TYPE 2 DIABETES MELLITUS WITHOUT COMPLICATION, WITH LONG-TERM CURRENT USE OF INSULIN (HCC): ICD-10-CM

## 2023-09-20 DIAGNOSIS — Z28.21 INFLUENZA VACCINATION DECLINED BY PATIENT: ICD-10-CM

## 2023-09-20 DIAGNOSIS — Z00.00 INITIAL MEDICARE ANNUAL WELLNESS VISIT: Primary | ICD-10-CM

## 2023-09-20 LAB
ALBUMIN SERPL-MCNC: 3.9 G/DL (ref 3.5–5)
ALBUMIN/GLOB SERPL: 1.2 (ref 1.1–2.2)
ALP SERPL-CCNC: 83 U/L (ref 45–117)
ALT SERPL-CCNC: 32 U/L (ref 12–78)
ANION GAP SERPL CALC-SCNC: 1 MMOL/L (ref 5–15)
AST SERPL-CCNC: 14 U/L (ref 15–37)
BILIRUB SERPL-MCNC: 0.8 MG/DL (ref 0.2–1)
BUN SERPL-MCNC: 25 MG/DL (ref 6–20)
BUN/CREAT SERPL: 20 (ref 12–20)
CALCIUM SERPL-MCNC: 9.4 MG/DL (ref 8.5–10.1)
CHLORIDE SERPL-SCNC: 111 MMOL/L (ref 97–108)
CHOLEST SERPL-MCNC: 148 MG/DL
CO2 SERPL-SCNC: 30 MMOL/L (ref 21–32)
CREAT SERPL-MCNC: 1.22 MG/DL (ref 0.7–1.3)
CREAT UR-MCNC: 113 MG/DL
ERYTHROCYTE [DISTWIDTH] IN BLOOD BY AUTOMATED COUNT: 12.7 % (ref 11.5–14.5)
EST. AVERAGE GLUCOSE BLD GHB EST-MCNC: 140 MG/DL
GLOBULIN SER CALC-MCNC: 3.3 G/DL (ref 2–4)
GLUCOSE SERPL-MCNC: 110 MG/DL (ref 65–100)
HBA1C MFR BLD: 6.5 % (ref 4–5.6)
HCT VFR BLD AUTO: 54.1 % (ref 36.6–50.3)
HDLC SERPL-MCNC: 44 MG/DL
HDLC SERPL: 3.4 (ref 0–5)
HGB BLD-MCNC: 17.6 G/DL (ref 12.1–17)
LDLC SERPL CALC-MCNC: 85.4 MG/DL (ref 0–100)
MCH RBC QN AUTO: 29.6 PG (ref 26–34)
MCHC RBC AUTO-ENTMCNC: 32.5 G/DL (ref 30–36.5)
MCV RBC AUTO: 91.1 FL (ref 80–99)
MICROALBUMIN UR-MCNC: 3.78 MG/DL
MICROALBUMIN/CREAT UR-RTO: 33 MG/G (ref 0–30)
NRBC # BLD: 0 K/UL (ref 0–0.01)
NRBC BLD-RTO: 0 PER 100 WBC
PLATELET # BLD AUTO: 246 K/UL (ref 150–400)
PMV BLD AUTO: 10.5 FL (ref 8.9–12.9)
POTASSIUM SERPL-SCNC: 4.6 MMOL/L (ref 3.5–5.1)
PROT SERPL-MCNC: 7.2 G/DL (ref 6.4–8.2)
PSA SERPL-MCNC: 0.6 NG/ML (ref 0.01–4)
RBC # BLD AUTO: 5.94 M/UL (ref 4.1–5.7)
SODIUM SERPL-SCNC: 142 MMOL/L (ref 136–145)
TRIGL SERPL-MCNC: 93 MG/DL
URATE SERPL-MCNC: 6.2 MG/DL (ref 3.5–7.2)
VLDLC SERPL CALC-MCNC: 18.6 MG/DL
WBC # BLD AUTO: 7.2 K/UL (ref 4.1–11.1)

## 2023-09-20 PROCEDURE — 99214 OFFICE O/P EST MOD 30 MIN: CPT | Performed by: INTERNAL MEDICINE

## 2023-09-20 PROCEDURE — 3017F COLORECTAL CA SCREEN DOC REV: CPT | Performed by: INTERNAL MEDICINE

## 2023-09-20 PROCEDURE — 3046F HEMOGLOBIN A1C LEVEL >9.0%: CPT | Performed by: INTERNAL MEDICINE

## 2023-09-20 PROCEDURE — 2022F DILAT RTA XM EVC RTNOPTHY: CPT | Performed by: INTERNAL MEDICINE

## 2023-09-20 PROCEDURE — 1123F ACP DISCUSS/DSCN MKR DOCD: CPT | Performed by: INTERNAL MEDICINE

## 2023-09-20 PROCEDURE — 4004F PT TOBACCO SCREEN RCVD TLK: CPT | Performed by: INTERNAL MEDICINE

## 2023-09-20 PROCEDURE — G8419 CALC BMI OUT NRM PARAM NOF/U: HCPCS | Performed by: INTERNAL MEDICINE

## 2023-09-20 PROCEDURE — G8427 DOCREV CUR MEDS BY ELIG CLIN: HCPCS | Performed by: INTERNAL MEDICINE

## 2023-09-20 PROCEDURE — G0438 PPPS, INITIAL VISIT: HCPCS | Performed by: INTERNAL MEDICINE

## 2023-09-20 RX ORDER — ZOSTER VACCINE RECOMBINANT, ADJUVANTED 50 MCG/0.5
0.5 KIT INTRAMUSCULAR SEE ADMIN INSTRUCTIONS
Qty: 0.5 ML | Refills: 0 | Status: SHIPPED | OUTPATIENT
Start: 2023-09-20 | End: 2024-03-18

## 2023-09-20 RX ORDER — BLOOD-GLUCOSE SENSOR
1 EACH MISCELLANEOUS
Qty: 2 EACH | Refills: 5 | Status: SHIPPED | OUTPATIENT
Start: 2023-09-20 | End: 2023-09-21 | Stop reason: SDUPTHER

## 2023-09-20 SDOH — ECONOMIC STABILITY: HOUSING INSECURITY
IN THE LAST 12 MONTHS, WAS THERE A TIME WHEN YOU DID NOT HAVE A STEADY PLACE TO SLEEP OR SLEPT IN A SHELTER (INCLUDING NOW)?: NO

## 2023-09-20 SDOH — ECONOMIC STABILITY: FOOD INSECURITY: WITHIN THE PAST 12 MONTHS, THE FOOD YOU BOUGHT JUST DIDN'T LAST AND YOU DIDN'T HAVE MONEY TO GET MORE.: NEVER TRUE

## 2023-09-20 SDOH — ECONOMIC STABILITY: FOOD INSECURITY: WITHIN THE PAST 12 MONTHS, YOU WORRIED THAT YOUR FOOD WOULD RUN OUT BEFORE YOU GOT MONEY TO BUY MORE.: NEVER TRUE

## 2023-09-20 SDOH — ECONOMIC STABILITY: INCOME INSECURITY: HOW HARD IS IT FOR YOU TO PAY FOR THE VERY BASICS LIKE FOOD, HOUSING, MEDICAL CARE, AND HEATING?: NOT HARD AT ALL

## 2023-09-20 ASSESSMENT — LIFESTYLE VARIABLES
HOW OFTEN DO YOU HAVE A DRINK CONTAINING ALCOHOL: NEVER
HOW MANY STANDARD DRINKS CONTAINING ALCOHOL DO YOU HAVE ON A TYPICAL DAY: PATIENT DOES NOT DRINK

## 2023-09-20 ASSESSMENT — PATIENT HEALTH QUESTIONNAIRE - PHQ9
SUM OF ALL RESPONSES TO PHQ QUESTIONS 1-9: 0
2. FEELING DOWN, DEPRESSED OR HOPELESS: 0
SUM OF ALL RESPONSES TO PHQ9 QUESTIONS 1 & 2: 0
SUM OF ALL RESPONSES TO PHQ QUESTIONS 1-9: 0
1. LITTLE INTEREST OR PLEASURE IN DOING THINGS: 0

## 2023-09-20 NOTE — PROGRESS NOTES
Medicare Annual Wellness Visit    Francis Sanford is here for Follow-up (6 months), Diabetes (DM2), and Medicare AWV    Assessment & Plan   Initial Medicare annual wellness visit  Recommend Shingrix vaccination. He will consider. Declines all influenza vaccines and declines further COVID-19 vaccine. He was diagnosed with COVID again on Sharyn 10, 2023. Discussed RSV vaccine which he also declines. Screening for prostate cancer  -     PSA Screening; Future    Type 2 diabetes mellitus without complication, with long-term current use of insulin ([facility])  Hypoglycemia associated with type 2 diabetes mellitus ([address])  Remains well controlled. Declines taking Ozempic because of cost.  He is continuing Jardiance and insulin and adjusting dose. Usually needs between 15 and 20 units.  -     Microalbumin / Creatinine Urine Ratio; Future  -     Hemoglobin A1C; Future  -      DIABETES FOOT EXAM    Dyslipidemia  This condition is controlled on current medication regimen as written in medication list.  Medications refilled. Continue atorvastatin 10 mg daily for cardiac protection with diabetes. -     Lipid Panel; Future    Borderline systolic HTN  Blood pressure is reasonably controlled on no medication. Could consider ACE inhibitor to help reduce risk of renal dysfunction, but Kendra Yodert is a similar function. -     Comprehensive Metabolic Panel; Future  -     Lipid Panel; Future    History of persistent cough  And symptoms of cough for a couple of months. It is mild and he states it is improving. His wife asked him to mention it. He does have a history of chewing tobacco.  History of COVID in June. Lungs are clear and oxygen is normal.  Could consider x-ray but will defer for now and continue to observe. Erythrocytosis  Saw oncology in the past.  Tobacco use may contribute. -     CBC; Future    Gout, unspecified cause, unspecified chronicity, unspecified site  Remains asymptomatic. Check uric acid.   -     Uric

## 2023-09-21 ENCOUNTER — TELEPHONE (OUTPATIENT)
Age: 68
End: 2023-09-21

## 2023-09-21 DIAGNOSIS — E11.9 TYPE 2 DIABETES MELLITUS WITHOUT COMPLICATION, WITH LONG-TERM CURRENT USE OF INSULIN (HCC): ICD-10-CM

## 2023-09-21 DIAGNOSIS — Z79.4 TYPE 2 DIABETES MELLITUS WITHOUT COMPLICATION, WITH LONG-TERM CURRENT USE OF INSULIN (HCC): ICD-10-CM

## 2023-09-21 DIAGNOSIS — Z29.11 NEED FOR IMMUNIZATION AGAINST RESPIRATORY SYNCYTIAL VIRUS: Primary | ICD-10-CM

## 2023-09-21 DIAGNOSIS — E11.649 HYPOGLYCEMIA ASSOCIATED WITH TYPE 2 DIABETES MELLITUS (HCC): ICD-10-CM

## 2023-09-21 RX ORDER — BLOOD-GLUCOSE SENSOR
1 EACH MISCELLANEOUS
Qty: 2 EACH | Refills: 5 | Status: SHIPPED | OUTPATIENT
Start: 2023-09-21

## 2023-09-21 NOTE — TELEPHONE ENCOUNTER
Patient called about Continuous Blood Gluc Sensor (FREESTYLE AYDEE 3 SENSOR) stating its costing him 100.00 + when he thought it was going to be at no cost to him.     He would like a call back  [facility] [phone] [facility])

## 2023-09-21 NOTE — TELEPHONE ENCOUNTER
Spoke with patient and he requests that the script for his sensors be sent to his [employer]. Dr. Yumiko Walters notified.

## 2023-09-21 NOTE — TELEPHONE ENCOUNTER
Spoke with patient regarding Continuous Blood Gluc Sensor (FREESTYLE AYDEE 3 SENSOR) stating its costing him 100.00 + from [employer]. Advised patient that it might be free if order via  a [employer] company and that I could order it through [employer] and see if it will be covered through his Medicare. He agrees. Grateful for the call. Submitted order through [employer] await confirmation.

## 2023-10-12 RX ORDER — BLOOD SUGAR DIAGNOSTIC
STRIP MISCELLANEOUS
Qty: 100 STRIP | Refills: 3 | Status: SHIPPED | OUTPATIENT
Start: 2023-10-12

## 2024-01-25 DIAGNOSIS — E78.5 DYSLIPIDEMIA: Primary | ICD-10-CM

## 2024-01-25 RX ORDER — ATORVASTATIN CALCIUM 10 MG/1
10 TABLET, FILM COATED ORAL DAILY
Qty: 90 TABLET | Refills: 1 | Status: SHIPPED | OUTPATIENT
Start: 2024-01-25

## 2024-03-20 ENCOUNTER — OFFICE VISIT (OUTPATIENT)
Age: 69
End: 2024-03-20
Payer: MEDICARE

## 2024-03-20 VITALS
WEIGHT: 200.2 LBS | BODY MASS INDEX: 27.12 KG/M2 | SYSTOLIC BLOOD PRESSURE: 126 MMHG | HEIGHT: 72 IN | RESPIRATION RATE: 19 BRPM | TEMPERATURE: 97.9 F | HEART RATE: 64 BPM | OXYGEN SATURATION: 95 % | DIASTOLIC BLOOD PRESSURE: 79 MMHG

## 2024-03-20 DIAGNOSIS — E11.649 HYPOGLYCEMIA ASSOCIATED WITH TYPE 2 DIABETES MELLITUS (HCC): ICD-10-CM

## 2024-03-20 DIAGNOSIS — E11.9 TYPE 2 DIABETES MELLITUS WITHOUT COMPLICATION, WITH LONG-TERM CURRENT USE OF INSULIN (HCC): Primary | ICD-10-CM

## 2024-03-20 DIAGNOSIS — Z79.4 TYPE 2 DIABETES MELLITUS WITHOUT COMPLICATION, WITH LONG-TERM CURRENT USE OF INSULIN (HCC): Primary | ICD-10-CM

## 2024-03-20 DIAGNOSIS — E78.5 DYSLIPIDEMIA: ICD-10-CM

## 2024-03-20 PROCEDURE — 2022F DILAT RTA XM EVC RTNOPTHY: CPT | Performed by: INTERNAL MEDICINE

## 2024-03-20 PROCEDURE — G8427 DOCREV CUR MEDS BY ELIG CLIN: HCPCS | Performed by: INTERNAL MEDICINE

## 2024-03-20 PROCEDURE — 1123F ACP DISCUSS/DSCN MKR DOCD: CPT | Performed by: INTERNAL MEDICINE

## 2024-03-20 PROCEDURE — 3046F HEMOGLOBIN A1C LEVEL >9.0%: CPT | Performed by: INTERNAL MEDICINE

## 2024-03-20 PROCEDURE — G8484 FLU IMMUNIZE NO ADMIN: HCPCS | Performed by: INTERNAL MEDICINE

## 2024-03-20 PROCEDURE — 4004F PT TOBACCO SCREEN RCVD TLK: CPT | Performed by: INTERNAL MEDICINE

## 2024-03-20 PROCEDURE — 3017F COLORECTAL CA SCREEN DOC REV: CPT | Performed by: INTERNAL MEDICINE

## 2024-03-20 PROCEDURE — G8419 CALC BMI OUT NRM PARAM NOF/U: HCPCS | Performed by: INTERNAL MEDICINE

## 2024-03-20 PROCEDURE — 99213 OFFICE O/P EST LOW 20 MIN: CPT | Performed by: INTERNAL MEDICINE

## 2024-03-20 ASSESSMENT — PATIENT HEALTH QUESTIONNAIRE - PHQ9
SUM OF ALL RESPONSES TO PHQ QUESTIONS 1-9: 0
SUM OF ALL RESPONSES TO PHQ QUESTIONS 1-9: 0
2. FEELING DOWN, DEPRESSED OR HOPELESS: NOT AT ALL
SUM OF ALL RESPONSES TO PHQ QUESTIONS 1-9: 0
SUM OF ALL RESPONSES TO PHQ9 QUESTIONS 1 & 2: 0
1. LITTLE INTEREST OR PLEASURE IN DOING THINGS: NOT AT ALL
SUM OF ALL RESPONSES TO PHQ QUESTIONS 1-9: 0

## 2024-03-20 NOTE — PROGRESS NOTES
HISTORY OF PRESENT ILLNESS    Chief Complaint   Patient presents with    Diabetes     Stopped checking glucose for a year because it was the same number.     Hypertension    Medication Evaluation     Unable to get Ozempic.        Presents for follow-up    Diabetes Mellitus follow-up  A1c 6.2% based on Micaela  Hemoglobin A1C   Date Value Ref Range Status   2023 6.5 (H) 4.0 - 5.6 % Final     Comment:     (NOTE)  HbA1C Interpretive Ranges  <5.7              Normal  5.7 - 6.4         Consider Prediabetes  >6.5              Consider Diabetes     medication compliance:   Taking Jardiance 25 mg daily.  Taking basaglar insulin 15 units HS  Diabetic diet compliance: compliant most of the time.   Home glucose monitorin day avg 131.  Time in target 95%  Hypoglycemic episodes:  None.    Further diabetic ROS: no polyuria or polydipsia, no chest pain, dyspnea or TIA's, no numbness, tingling or pain in extremities.     Wt Readings from Last 3 Encounters:   24 90.8 kg (200 lb 3.2 oz)   23 90.3 kg (199 lb)   06/10/23 88.9 kg (196 lb)       Hyperlipidemia  Currently he takes lipitor 10 mg  ROS: taking medications as instructed, no medication side effects noted  No new myalgias, no joint pains, no weakness  No TIA's, no chest pain on exertion, no dyspnea on exertion, no swelling of ankles.   Lab Results   Component Value Date    CHOL 148 2023    TRIG 93 2023    HDL 44 2023    LDLCALC 85.4 2023    VLDL 25 2021    CHOLHDLRATIO 3.4 2023       Erythrocytosis followed up with hematology on December 15.  Myeloproliferative blood work negative.  Recommend to consider sleep study.  Recommended to stop dipping tobacco.  Consider consultation with hepatology for nonalcoholic fatty liver disease.      Review of Systems   All other systems reviewed and are negative, except as noted in HPI    Past Medical and Surgical History   has a past medical history of COVID-19 (2021), Diabetes

## 2024-06-30 DIAGNOSIS — Z79.4 TYPE 2 DIABETES MELLITUS WITHOUT COMPLICATION, WITH LONG-TERM CURRENT USE OF INSULIN (HCC): ICD-10-CM

## 2024-06-30 DIAGNOSIS — E11.9 TYPE 2 DIABETES MELLITUS WITHOUT COMPLICATION, WITH LONG-TERM CURRENT USE OF INSULIN (HCC): ICD-10-CM

## 2024-07-01 RX ORDER — EMPAGLIFLOZIN 25 MG/1
25 TABLET, FILM COATED ORAL DAILY
Qty: 90 TABLET | Refills: 0 | Status: SHIPPED | OUTPATIENT
Start: 2024-07-01

## 2024-07-22 DIAGNOSIS — E78.5 DYSLIPIDEMIA: ICD-10-CM

## 2024-07-22 RX ORDER — ATORVASTATIN CALCIUM 10 MG/1
10 TABLET, FILM COATED ORAL DAILY
Qty: 180 TABLET | Refills: 1 | Status: SHIPPED | OUTPATIENT
Start: 2024-07-22

## 2024-07-22 RX ORDER — INSULIN GLARGINE 100 [IU]/ML
INJECTION, SOLUTION SUBCUTANEOUS
Qty: 45 ML | Refills: 5 | Status: SHIPPED | OUTPATIENT
Start: 2024-07-22

## 2024-07-22 NOTE — TELEPHONE ENCOUNTER
PCP: Jonathon Hilliard MD    Last appt: 3/20/2024   Future Appointments   Date Time Provider Department Center   10/23/2024  9:00 AM Jonathon Hilliard MD [facility] AMB       Requested Prescriptions     Pending Prescriptions Disp Refills    atorvastatin (LIPITOR) 10 MG tablet [Pharmacy Med Name: ATORVASTATIN 10 MG TAB] 180 tablet 0     Sig: TAKE ONE TABLET BY MOUTH ONE TIME DAILY    BASAGLAR KWIKPEN 100 UNIT/ML injection pen [Pharmacy Med Name: BASAGLAR 100 UNIT/ML KWIKPEN[*$R]] 45 mL 0     Sig: INJECT 15 UNITS UNDER THE SKIN NIGHTLY     Received request for refill from [facility] to [facility] on [facility] for refill on atorvastatin 10mg QD, 180 tabs with 0 refills and basaglar kwikpen 100 unit/ml injection pen, inject 15 units nightly, 45ml with 0 refills. Rxs in pending for provider review and approval.     Last A1C on 9/20/2023 was 6.5    Medication list reviewed for interactions and contraindications.     Joselyn \"Ken\" ALIREZA Hitchcock

## 2024-08-25 NOTE — TELEPHONE ENCOUNTER
Letter mailed to patient. NEPHROLOGY/RENAL follow up PROGRESS NOTE     Patient: Norma Shore MRN # [id number]   : 1969 Attending: Jose Palacios MD   54 year old female Date of admission 8/15/2024    Chief complaint: ESRD on HD TTS, PD MWF    Subjective/Interval History:      Sitting in bedside chair  CAPD in progress  Icodextran in  Eating breakfast    Vital Last Value 24Hour Range   Temperature 97.9 °F (36.6 °C) Temp  Min: 97.8 °F (36.6 °C)  Max: 98.2 °F (36.8 °C)   Pulse (!) 105 Pulse  Min: 104  Max: 109   Respiratory (!) 31 Resp  Min: 16  Max: 53   Blood Pressure (!) 73/42 BP  Min: 60/39  Max: 115/53   ArtBP 83/43 No data recorded   Pulse Oximetry 98 % SpO2  Min: 84 %  Max: 100 %     Vital Today Admitted   Weight 49 kg (108 lb 0.4 oz) Weight: 62.1 kg (136 lb 14.5 oz)   Height N/A Height: 5' 3\" (160 cm)   BMI N/A BMI (Calculated): 24.25     Intake/Output:     Intake/Output Summary (Last 24 hours) at 2024 0957  Last data filed at 2024 0600  Gross per 24 hour   Intake 724 ml   Output 2150 ml   Net -1426 ml          Medications/Infusions:   Scheduled  Current Facility-Administered Medications   Medication Dose Route Frequency Provider Last Rate Last Admin    insulin lispro (ADMELOG,HumaLOG) - Correction Dose   Subcutaneous TID WC Jose Palacios MD        albumin human (SPA) 25 % injection 25 g  25 g Intravenous 4 times per day Jose Palacios MD        Dianeal Low Calcium/2.5% 2,000 mL with heparin (porcine) 1,000 Units peritoneal dialysis manual exchange solution   Intraperitoneal TID Beverly Mojica MD   Given at 24 2344    Icodextrin 7.5% 7.5 % 2,500 mL with heparin (porcine) 1,300 Units peritoneal dialysis manual exchange solution   Intraperitoneal Q24H Beverly Mojica MD   Given at 24 0216    fludrocortisone (FLORINEF) tablet 0.2 mg  0.2 mg Oral Daily Jose Palacios MD   0.2 mg at 24 0904    predniSONE (DELTASONE) tablet 5 mg  5 mg Oral Daily with breakfast Jose Palacios MD   5 mg at  08/25/24 0904    heparin (porcine) injection 5,000 Units  5,000 Units Subcutaneous 3 times per day Karoline Dillon APNP   5,000 Units at 08/25/24 0513    pantoprazole (PROTONIX) EC tablet 40 mg  40 mg Oral Nightly Jose Palacios MD   40 mg at 08/24/24 2128    midodrine (PROAMATINE) tablet 20 mg  20 mg Oral 4 times per day Jose Palacios MD   20 mg at 08/25/24 0512    gentamicin (GARAMYCIN) 0.1 % cream   Topical Daily Jose Palacios MD   Given at 08/24/24 2127    sertraline (ZOLOFT) tablet 25 mg  25 mg Oral Daily Jose Palacios MD   25 mg at 08/25/24 0904    lidocaine (LIDOCARE) 4 % patch 1 patch  1 patch Transdermal Q Evening Jose Palacios MD   1 patch at 08/24/24 1755    B complex-vitamin C-folic acid (NEPHRO-FREDIS) tablet 0.8 mg  1 tablet Oral Daily Jose Jones MD   0.8 mg at 08/25/24 0904    sodium chloride 0.9 % injection 2 mL  2 mL Intracatheter 2 times per day Mary Schilling MD   2 mL at 08/24/24 0823    TACROlimus (PROGRAF) capsule 3 mg  3 mg Oral Daily Tx Dionrah Farah APNP   3 mg at 08/25/24 0904    And    TACROlimus (PROGRAF) capsule 2.5 mg  2.5 mg Oral Nightly Tx Dinorah Farah APNP   2.5 mg at 08/24/24 2128    allopurinol (ZYLOPRIM) tablet 200 mg  200 mg Oral Daily Mary Schilling MD   200 mg at 08/25/24 0904    levothyroxine (SYNTHROID, LEVOTHROID) tablet 125 mcg  125 mcg Oral Daily Mary Schilling MD   125 mcg at 08/25/24 0512    gabapentin (NEURONTIN) capsule 200 mg  200 mg Oral Nightly Mary Schilling MD   200 mg at 08/24/24 2128     Continuous Infusions:  Current Facility-Administered Medications   Medication Dose Route Frequency Provider Last Rate Last Admin    NORepinephrine (LEVOPHED) 8 mg/250 mL in dextrose 5 % infusion  0-5 mcg/min Intravenous Continuous Jose Palacios MD 9.38 mL/hr at 08/25/24 0520 5 mcg/min at 08/25/24 0520    sodium chloride 0.9% infusion   Intravenous Continuous MICHAELN Mary Schilling MD 10 mL/hr at 08/25/24 0533 New Bag at 08/25/24 0533     sodium chloride 0.9% infusion   Intravenous Continuous PRN Mary Schilling MD   Completed at 08/20/24 1536       Physical Exam:  Visit Vitals  BP (!) 73/42   Pulse (!) 105   Temp 97.9 °F (36.6 °C) (Oral)   Resp (!) 31   Ht 5' 3\" (1.6 m)   Wt 49 kg (108 lb 0.4 oz)   LMP  (LMP Unknown)   SpO2 98%   BMI 19.14 kg/m²     GENERAL: chronically ill appearing nad.  HEENT: NC/AT. MMM. Anicteric. Facial edema improved  PULM: Diminished, mild dyspnea with speech. Oxymask  CVS: nl s1 s2 no rub noted.  Abd: Soft, distended, NT  EXT: LE edema - none, BUE edema improved  NEURO: Alert, oriented, moving upper extremities  PSYCH: calm/cooperative.   Dialysis Access: PD catheter insit    Laboratory Results:  Recent Labs   Lab 08/25/24  0324 08/24/24  0413 08/23/24  0357 08/22/24  1636 08/22/24  0406 08/21/24  1530 08/21/24  0356 08/20/24  1500 08/20/24  0423 08/19/24  1532 08/19/24  0428 08/19/24  0043 08/18/24  1610   GLUCOSE 149* 140* 156* 150* 152* 148* 166* 249* 182* 266* 97  --  161*   SODIUM 130* 129* 133* 136 133* 135 135 135 135 135 136  --  136   POTASSIUM 4.0 3.7 3.9 4.2 5.0 3.8 4.3 4.1 4.0 4.5 3.3* 3.6 3.6   CHLORIDE 91* 92* 98 103 102 102 103 102 103 104 102  --  103   CO2 25 25 30 27 27 27 26 26 28 25 26  --  24   BUN 46* 42* 17 11 7 5* 6 4* 2* 2* 3*  --  6   CREATININE 3.49* 2.76* 1.47* 0.72 0.67 0.61 0.53 0.42* 0.43* 0.54 0.59  --  0.81   ANIONGAP 18 16 9 10 9 10 10 11 8 11 11  --  13   BILIRUBIN 0.9  --   --   --   --   --   --   --  0.6  --   --   --   --    AST 51*  --   --   --   --   --   --   --  14  --   --   --   --    GPT 74*  --   --   --   --   --   --   --  25  --   --   --   --    ALKPT 242*  --   --   --   --   --   --   --  121*  --   --   --   --    ALBUMIN 3.9  --   --   --   --   --   --   --  3.8  --   --   --   --    MG 2.1  --  2.5* 2.5* 2.7* 2.6* 2.5* 2.4 2.7* 2.6* 2.5*  --  2.1   CALCIUM 9.6 9.8 9.2 9.0 8.9 9.3 9.0 8.8 8.8 8.4 8.6  --  8.4   PHOS 4.7 3.4 2.1* 2.0* 2.2* 2.0* 2.1* 1.4* 2.4 2.0* 1.4*  1.6* 1.2*      Recent Labs   Lab 08/25/24  0324 08/24/24  0413 08/23/24  0357 08/22/24  1636 08/22/24  0406 08/21/24  1531 08/21/24  1530 08/21/24  0356 08/20/24  1500 08/20/24  0423 08/19/24  1532 08/19/24  0428 08/18/24  1610   CALCIUM 9.6 9.8 9.2 9.0 8.9  --  9.3 9.0 8.8 8.8 8.4 8.6 8.4   LOUIS  --   --  1.21 1.12* 1.15 1.14*  --  1.16 1.10* 1.14* 1.07* 1.11* 1.22     Recent Labs   Lab 08/25/24  0324 08/24/24  0413 08/23/24  1610 08/23/24  0357 08/22/24  1636 08/22/24  0406 08/21/24  1530 08/21/24  0356 08/20/24  1007 08/20/24  0423 08/19/24  0428   INR  --   --   --   --   --  1.0  --  1.0  --  1.5 3.0   WBC 17.1* 18.1* 15.7* 14.5* 20.3* 13.9* 14.2* 9.3 5.9  --  5.8   HGB 11.0* 10.7* 11.0* 10.9* 10.8* 10.0* 10.4* 9.0* 9.0*  --  9.7*   HCT 36.7 34.9* 37.3 36.0 36.4 34.2* 35.6* 31.6* 29.3*  --  32.4*    189 229 206 279 227 220 157 140  --  143       Iron Panel  No results found      Urinalysis  No results found    Microbiology: reviewed      Radiology:  XR NASOGASTRIC TUBE CHECK ABDOMEN   Final Result   IMPRESSION:             Distal tip of enteric tube projects over the left upper quadrant in the   region of stomach. Moderate gas distention of stomach. Numerous additional   devices overlie the patient. Not well characterized on the current study.   Basilar airspace opacities. Breast implants.      Electronically Signed by: Wing Menendez MD   Signed on: 8/18/2024 2:01 PM   Created on Workstation ID: [id number]   Signed on Workstation ID: [id number]      XR CHEST AP OR PA   Final Result   IMPRESSION: Cardiomediastinal silhouette has not significantly changed.   Diffuse interstitial prominence and bilateral opacities, probably related   to pulmonary edema, similar to prior studies. Large bore left subclavian   central line, EKG leads, pacemaker and bilateral breast implants are again   noted. Vascular calcifications. Overall, no significant change from recent   study.         Electronically Signed by: Jonel  MD Stefano   Signed on: 8/17/2024 9:53 PM   Created on Workstation ID: [id number]   Signed on Workstation ID: [id number]      XR CHEST AP OR PA   Final Result   IMPRESSION:    Lines and tubes as above.   Moderate diffuse interstitial prominence suggesting marked congestive   changes. Superimposed infiltrate not excluded. Similar to prior      Electronically Signed by: Wing Menendez MD   Signed on: 8/17/2024 11:34 AM   Created on Workstation ID: [id number]   Signed on Workstation ID: [id number]      CT HEAD WO CONTRAST   Final Result   IMPRESSION:   No acute appearing intracranial abnormality.   Motion degradation.         Electronically Signed by: Wilfrido Bermudez MD   Signed on: 8/15/2024 3:06 PM   Created on Workstation ID: [id number]   Signed on Workstation ID: [id number]      CTA CHEST PE AND CT ABD PEL W CONTRAST   Final Result   IMPRESSION:           1. No pulmonary embolism.      2. Congestive heart failure. Findings include mild cardiomegaly, small   bilateral pleural effusions, and a small amount of dependent airspace   opacities. Additionally, marked reflux of contrast into the hepatic veins   and a distended inferior vena cava are present. A cardiac congestive   heterogeneous enhancement pattern of the liver is present present as well.      3. Given the history of fever/sepsis, a superimposed pneumonia could be a   contributing factor to the above described parenchymal opacities.      4. 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      5. Small volume ascites. This could be attributable to peritoneal dialysis   and/or secondary to passive cardiac congestive edema.      6. 27 mm right adrenal mass compatible with benign adenoma versus benign   myelolipoma.      7. 22 mm benign hyperdense right renal cyst.      8. Moderate size left abdominal wall spigelian hernia. No herniation of   bowel is present. The peritoneal dialysis catheter is coiled within the   hernia sac but with no  fluid surrounding the catheter.      9. Mild splenomegaly.      10. Abundant lymph nodes are present in the small bowel mesentery including   one enlarged 13 mm lymph node. These are nonspecific but stable. No other   adenopathy is present.      11. A small geographic region of parenchymal robust enhancement is present   in the right middle lobe. This is likely due to a venous malformation given   the regional prominent extrapleural and mediastinal venous structures and   the robust enhancement associated with the a cardiac congestive phenomenon.      Electronically Signed by: Cecil Enrique MD   Signed on: 8/15/2024 3:11 PM   Created on Workstation ID: [id number]   Signed on Workstation ID: [id number]      XR CHEST PA OR AP 1 VIEW   Final Result   IMPRESSION:      Moderate congestive heart failure. Given the history of fever, pneumonia   may be a contributing factor to the parenchymal opacities.         I, Attending Radiologist Cecil Enrique MD, have reviewed the images and   report and concur with these findings interpreted by Resident Radiologist,   Gonzales Mercado MD.      Electronically Signed by: Cecil Enrique MD   Signed on: 8/15/2024 1:00 PM   Created on Workstation ID: [id number]   Signed on Workstation ID: [id number]            Echo Ejection Fraction:   Ejection Fraction   Date Value Ref Range Status   08/16/2024 70 % Final       Diagnosis/Plan:    # ESRD on PD TTS MWF HD [place], Dr. Walls)     # Hypotension, cardiogenic shock.      # Secondary hyperparathyroidism -PTA renvela on hold for low PO4, on calcitriol     # Anemia. TARIK    # Heart transplant - immunosuppression per transplant cardiology     # h/o Severe tricuspid regurgitation     # Acute on chronic hypoxemic respiratory failure - on 3L NC O2 at baseline     # AF with h/o RVR    RECS:    Continue CAPD  On midodrine and florinef for hypotension  SPA as needed  Labs am      Beverly Mojica MD   [phone]

## 2024-09-25 DIAGNOSIS — E11.9 TYPE 2 DIABETES MELLITUS WITHOUT COMPLICATION, WITH LONG-TERM CURRENT USE OF INSULIN (HCC): ICD-10-CM

## 2024-09-25 DIAGNOSIS — Z79.4 TYPE 2 DIABETES MELLITUS WITHOUT COMPLICATION, WITH LONG-TERM CURRENT USE OF INSULIN (HCC): ICD-10-CM

## 2024-09-26 RX ORDER — EMPAGLIFLOZIN 25 MG/1
25 TABLET, FILM COATED ORAL DAILY
Qty: 90 TABLET | Refills: 1 | Status: SHIPPED | OUTPATIENT
Start: 2024-09-26

## 2024-10-23 ENCOUNTER — OFFICE VISIT (OUTPATIENT)
Age: 69
End: 2024-10-23
Payer: MEDICARE

## 2024-10-23 VITALS
BODY MASS INDEX: 27.14 KG/M2 | RESPIRATION RATE: 18 BRPM | DIASTOLIC BLOOD PRESSURE: 87 MMHG | OXYGEN SATURATION: 95 % | HEIGHT: 72 IN | WEIGHT: 200.4 LBS | TEMPERATURE: 98 F | SYSTOLIC BLOOD PRESSURE: 142 MMHG | HEART RATE: 72 BPM

## 2024-10-23 DIAGNOSIS — E11.9 TYPE 2 DIABETES MELLITUS WITHOUT COMPLICATION, WITH LONG-TERM CURRENT USE OF INSULIN (HCC): Primary | ICD-10-CM

## 2024-10-23 DIAGNOSIS — E78.5 DYSLIPIDEMIA: ICD-10-CM

## 2024-10-23 DIAGNOSIS — Z28.21 INFLUENZA VACCINATION DECLINED BY PATIENT: ICD-10-CM

## 2024-10-23 DIAGNOSIS — Z12.5 SCREENING FOR PROSTATE CANCER: ICD-10-CM

## 2024-10-23 DIAGNOSIS — Z00.00 MEDICARE ANNUAL WELLNESS VISIT, SUBSEQUENT: Primary | ICD-10-CM

## 2024-10-23 DIAGNOSIS — M10.9 GOUT, UNSPECIFIED CAUSE, UNSPECIFIED CHRONICITY, UNSPECIFIED SITE: ICD-10-CM

## 2024-10-23 DIAGNOSIS — R03.0 BORDERLINE SYSTOLIC HTN: ICD-10-CM

## 2024-10-23 DIAGNOSIS — E11.9 TYPE 2 DIABETES MELLITUS WITHOUT COMPLICATION, WITH LONG-TERM CURRENT USE OF INSULIN (HCC): ICD-10-CM

## 2024-10-23 DIAGNOSIS — Z79.4 TYPE 2 DIABETES MELLITUS WITHOUT COMPLICATION, WITH LONG-TERM CURRENT USE OF INSULIN (HCC): ICD-10-CM

## 2024-10-23 DIAGNOSIS — Z86.0100 HISTORY OF COLONIC POLYPS: ICD-10-CM

## 2024-10-23 DIAGNOSIS — Z79.4 TYPE 2 DIABETES MELLITUS WITHOUT COMPLICATION, WITH LONG-TERM CURRENT USE OF INSULIN (HCC): Primary | ICD-10-CM

## 2024-10-23 DIAGNOSIS — D75.1 ERYTHROCYTOSIS: ICD-10-CM

## 2024-10-23 PROBLEM — Z85.828 HISTORY OF BASAL CELL CARCINOMA (BCC): Status: ACTIVE | Noted: 2024-10-23

## 2024-10-23 LAB
ALBUMIN SERPL-MCNC: 3.9 G/DL (ref 3.5–5)
ALBUMIN/GLOB SERPL: 1.3 (ref 1.1–2.2)
ALP SERPL-CCNC: 86 U/L (ref 45–117)
ALT SERPL-CCNC: 33 U/L (ref 12–78)
ANION GAP SERPL CALC-SCNC: 3 MMOL/L (ref 2–12)
AST SERPL-CCNC: 10 U/L (ref 15–37)
BILIRUB SERPL-MCNC: 1.2 MG/DL (ref 0.2–1)
BUN SERPL-MCNC: 20 MG/DL (ref 6–20)
BUN/CREAT SERPL: 16 (ref 12–20)
CALCIUM SERPL-MCNC: 9.2 MG/DL (ref 8.5–10.1)
CHLORIDE SERPL-SCNC: 108 MMOL/L (ref 97–108)
CHOLEST SERPL-MCNC: 168 MG/DL
CO2 SERPL-SCNC: 29 MMOL/L (ref 21–32)
CREAT SERPL-MCNC: 1.25 MG/DL (ref 0.7–1.3)
CREAT UR-MCNC: 97.1 MG/DL
ERYTHROCYTE [DISTWIDTH] IN BLOOD BY AUTOMATED COUNT: 12.6 % (ref 11.5–14.5)
EST. AVERAGE GLUCOSE BLD GHB EST-MCNC: 137 MG/DL
GLOBULIN SER CALC-MCNC: 3.1 G/DL (ref 2–4)
GLUCOSE SERPL-MCNC: 135 MG/DL (ref 65–100)
HBA1C MFR BLD: 6.4 % (ref 4–5.6)
HCT VFR BLD AUTO: 53.8 % (ref 36.6–50.3)
HDLC SERPL-MCNC: 52 MG/DL
HDLC SERPL: 3.2 (ref 0–5)
HGB BLD-MCNC: 17.7 G/DL (ref 12.1–17)
LDLC SERPL CALC-MCNC: 88.2 MG/DL (ref 0–100)
MCH RBC QN AUTO: 30.3 PG (ref 26–34)
MCHC RBC AUTO-ENTMCNC: 32.9 G/DL (ref 30–36.5)
MCV RBC AUTO: 92 FL (ref 80–99)
MICROALBUMIN UR-MCNC: 3.23 MG/DL
MICROALBUMIN/CREAT UR-RTO: 33 MG/G (ref 0–30)
NRBC # BLD: 0 K/UL (ref 0–0.01)
NRBC BLD-RTO: 0 PER 100 WBC
PLATELET # BLD AUTO: 244 K/UL (ref 150–400)
PMV BLD AUTO: 10.6 FL (ref 8.9–12.9)
POTASSIUM SERPL-SCNC: 4.4 MMOL/L (ref 3.5–5.1)
PROT SERPL-MCNC: 7 G/DL (ref 6.4–8.2)
PSA SERPL-MCNC: 0.6 NG/ML (ref 0.01–4)
RBC # BLD AUTO: 5.85 M/UL (ref 4.1–5.7)
SODIUM SERPL-SCNC: 140 MMOL/L (ref 136–145)
TRIGL SERPL-MCNC: 139 MG/DL
VLDLC SERPL CALC-MCNC: 27.8 MG/DL
WBC # BLD AUTO: 8.9 K/UL (ref 4.1–11.1)

## 2024-10-23 PROCEDURE — G8427 DOCREV CUR MEDS BY ELIG CLIN: HCPCS | Performed by: INTERNAL MEDICINE

## 2024-10-23 PROCEDURE — G0439 PPPS, SUBSEQ VISIT: HCPCS | Performed by: INTERNAL MEDICINE

## 2024-10-23 PROCEDURE — 4004F PT TOBACCO SCREEN RCVD TLK: CPT | Performed by: INTERNAL MEDICINE

## 2024-10-23 PROCEDURE — G8419 CALC BMI OUT NRM PARAM NOF/U: HCPCS | Performed by: INTERNAL MEDICINE

## 2024-10-23 PROCEDURE — 1159F MED LIST DOCD IN RCRD: CPT | Performed by: INTERNAL MEDICINE

## 2024-10-23 PROCEDURE — 99214 OFFICE O/P EST MOD 30 MIN: CPT | Performed by: INTERNAL MEDICINE

## 2024-10-23 PROCEDURE — 2022F DILAT RTA XM EVC RTNOPTHY: CPT | Performed by: INTERNAL MEDICINE

## 2024-10-23 PROCEDURE — 1123F ACP DISCUSS/DSCN MKR DOCD: CPT | Performed by: INTERNAL MEDICINE

## 2024-10-23 PROCEDURE — 3044F HG A1C LEVEL LT 7.0%: CPT | Performed by: INTERNAL MEDICINE

## 2024-10-23 PROCEDURE — G8484 FLU IMMUNIZE NO ADMIN: HCPCS | Performed by: INTERNAL MEDICINE

## 2024-10-23 PROCEDURE — 1126F AMNT PAIN NOTED NONE PRSNT: CPT | Performed by: INTERNAL MEDICINE

## 2024-10-23 PROCEDURE — 1160F RVW MEDS BY RX/DR IN RCRD: CPT | Performed by: INTERNAL MEDICINE

## 2024-10-23 PROCEDURE — 3017F COLORECTAL CA SCREEN DOC REV: CPT | Performed by: INTERNAL MEDICINE

## 2024-10-23 RX ORDER — ATORVASTATIN CALCIUM 10 MG/1
10 TABLET, FILM COATED ORAL DAILY
Qty: 90 TABLET | Refills: 3
Start: 2024-10-23

## 2024-10-23 RX ORDER — BLOOD SUGAR DIAGNOSTIC
1 STRIP MISCELLANEOUS DAILY
Qty: 100 STRIP | Refills: 3 | Status: SHIPPED | OUTPATIENT
Start: 2024-10-23

## 2024-10-23 RX ORDER — PEN NEEDLE, DIABETIC 32GX 5/32"
1 NEEDLE, DISPOSABLE MISCELLANEOUS DAILY
Qty: 100 EACH | Refills: 3 | Status: SHIPPED | OUTPATIENT
Start: 2024-10-23

## 2024-10-23 SDOH — ECONOMIC STABILITY: INCOME INSECURITY: HOW HARD IS IT FOR YOU TO PAY FOR THE VERY BASICS LIKE FOOD, HOUSING, MEDICAL CARE, AND HEATING?: NOT HARD AT ALL

## 2024-10-23 SDOH — ECONOMIC STABILITY: FOOD INSECURITY: WITHIN THE PAST 12 MONTHS, YOU WORRIED THAT YOUR FOOD WOULD RUN OUT BEFORE YOU GOT MONEY TO BUY MORE.: NEVER TRUE

## 2024-10-23 SDOH — ECONOMIC STABILITY: FOOD INSECURITY: WITHIN THE PAST 12 MONTHS, THE FOOD YOU BOUGHT JUST DIDN'T LAST AND YOU DIDN'T HAVE MONEY TO GET MORE.: NEVER TRUE

## 2024-10-23 ASSESSMENT — PATIENT HEALTH QUESTIONNAIRE - PHQ9
1. LITTLE INTEREST OR PLEASURE IN DOING THINGS: NOT AT ALL
SUM OF ALL RESPONSES TO PHQ QUESTIONS 1-9: 0
SUM OF ALL RESPONSES TO PHQ QUESTIONS 1-9: 0
2. FEELING DOWN, DEPRESSED OR HOPELESS: NOT AT ALL
SUM OF ALL RESPONSES TO PHQ QUESTIONS 1-9: 0
SUM OF ALL RESPONSES TO PHQ QUESTIONS 1-9: 0
SUM OF ALL RESPONSES TO PHQ9 QUESTIONS 1 & 2: 0

## 2024-10-23 ASSESSMENT — LIFESTYLE VARIABLES
HOW OFTEN DO YOU HAVE A DRINK CONTAINING ALCOHOL: MONTHLY OR LESS
HOW MANY STANDARD DRINKS CONTAINING ALCOHOL DO YOU HAVE ON A TYPICAL DAY: 1 OR 2

## 2024-10-23 NOTE — PROGRESS NOTES
\"Have you been to the ER, urgent care clinic since your last visit?  Hospitalized since your last visit?\"    NO    “Have you seen or consulted any other health care providers outside our system since your last visit?”    NO      “Have you had a diabetic eye exam?”    NO     Date of last diabetic eye exam: 9/15/2023            
weakness  No TIA's, no chest pain on exertion, no dyspnea on exertion, no swelling of ankles.         Lab Results   Component Value Date     CHOL 148 09/20/2023     TRIG 93 09/20/2023     HDL 44 09/20/2023     LDLCALC 85.4 09/20/2023     VLDL 25 02/16/2021     CHOLHDLRATIO 3.4 09/20/2023     Erythrocytosis followed up with hematology on December 15.2023.  Myeloproliferative blood work negative.  Recommend to consider sleep study.  Recommended to stop dipping tobacco.  Consider consultation with hepatology for nonalcoholic fatty liver disease.    Gout follow-up  Past gout history: acute gouty arthritis.  Current gout treatment: None  Side effects of current therapy: none.  Progress since last visit:  no acute gout attacks since last clinic visit..  The patient is attempting to avoid high purine foods and alcohol.   Lab Results   Component Value Date    URICACID 6.2 09/20/2023            Patient's complete Health Risk Assessment and screening values have been reviewed and are found in Flowsheets. The following problems were reviewed today and where indicated follow up appointments were made and/or referrals ordered.    Positive Risk Factor Screenings with Interventions:                     Vision Screen:  Do you have difficulty driving, watching TV, or doing any of your daily activities because of your eyesight?: No  Have you had an eye exam within the past year?: (!) No  Interventions:   Patient encouraged to make appointment with their eye specialist                    Objective   Vitals:    10/23/24 0900   BP: (!) 142/87   Site: Left Upper Arm   Position: Sitting   Cuff Size: Medium Adult   Pulse: 72   Resp: 18   Temp: 98 °F (36.7 °C)   TempSrc: Oral   SpO2: 95%   Weight: 90.9 kg (200 lb 6.4 oz)   Height: 1.829 m (6')      Body mass index is 27.18 kg/m².        General Appearance: alert and oriented to person, place and time, well developed and well- nourished, in no acute distress  Skin: warm and dry, no rash or

## 2025-01-03 DIAGNOSIS — Z79.4 TYPE 2 DIABETES MELLITUS WITHOUT COMPLICATION, WITH LONG-TERM CURRENT USE OF INSULIN (HCC): ICD-10-CM

## 2025-01-03 DIAGNOSIS — E11.9 TYPE 2 DIABETES MELLITUS WITHOUT COMPLICATION, WITH LONG-TERM CURRENT USE OF INSULIN (HCC): ICD-10-CM

## 2025-01-03 DIAGNOSIS — E11.649 HYPOGLYCEMIA ASSOCIATED WITH TYPE 2 DIABETES MELLITUS (HCC): ICD-10-CM

## 2025-01-03 RX ORDER — BLOOD SUGAR DIAGNOSTIC
1 STRIP MISCELLANEOUS DAILY
Qty: 100 STRIP | Refills: 3 | Status: SHIPPED | OUTPATIENT
Start: 2025-01-03

## 2025-01-03 RX ORDER — ACYCLOVIR 800 MG/1
1 TABLET ORAL
Qty: 2 EACH | Refills: 5 | Status: SHIPPED | OUTPATIENT
Start: 2025-01-03

## 2025-01-03 RX ORDER — BLOOD SUGAR DIAGNOSTIC
1 STRIP MISCELLANEOUS DAILY
Qty: 100 STRIP | Refills: 3 | Status: SHIPPED | OUTPATIENT
Start: 2025-01-03 | End: 2025-01-03 | Stop reason: SDUPTHER

## 2025-02-04 RX ORDER — PEN NEEDLE, DIABETIC 32GX 5/32"
1 NEEDLE, DISPOSABLE MISCELLANEOUS DAILY
Qty: 100 EACH | Refills: 3 | Status: SHIPPED | OUTPATIENT
Start: 2025-02-04

## 2025-02-05 ENCOUNTER — PATIENT MESSAGE (OUTPATIENT)
Facility: CLINIC | Age: 70
End: 2025-02-05

## 2025-03-24 DIAGNOSIS — E11.9 TYPE 2 DIABETES MELLITUS WITHOUT COMPLICATION, WITH LONG-TERM CURRENT USE OF INSULIN: ICD-10-CM

## 2025-03-24 DIAGNOSIS — Z79.4 TYPE 2 DIABETES MELLITUS WITHOUT COMPLICATION, WITH LONG-TERM CURRENT USE OF INSULIN: ICD-10-CM

## 2025-03-25 RX ORDER — EMPAGLIFLOZIN 25 MG/1
25 TABLET, FILM COATED ORAL DAILY
Qty: 90 TABLET | Refills: 1 | Status: ACTIVE | OUTPATIENT
Start: 2025-03-25

## 2025-04-23 ENCOUNTER — OFFICE VISIT (OUTPATIENT)
Facility: CLINIC | Age: 70
End: 2025-04-23
Payer: MEDICARE

## 2025-04-23 VITALS
WEIGHT: 196.8 LBS | OXYGEN SATURATION: 96 % | SYSTOLIC BLOOD PRESSURE: 139 MMHG | HEIGHT: 72 IN | RESPIRATION RATE: 14 BRPM | TEMPERATURE: 98 F | BODY MASS INDEX: 26.66 KG/M2 | HEART RATE: 64 BPM | DIASTOLIC BLOOD PRESSURE: 83 MMHG

## 2025-04-23 DIAGNOSIS — E11.649 HYPOGLYCEMIA ASSOCIATED WITH TYPE 2 DIABETES MELLITUS (HCC): ICD-10-CM

## 2025-04-23 DIAGNOSIS — E78.5 DYSLIPIDEMIA: ICD-10-CM

## 2025-04-23 DIAGNOSIS — Z79.4 TYPE 2 DIABETES MELLITUS WITHOUT COMPLICATION, WITH LONG-TERM CURRENT USE OF INSULIN (HCC): Primary | ICD-10-CM

## 2025-04-23 DIAGNOSIS — E11.9 TYPE 2 DIABETES MELLITUS WITHOUT COMPLICATION, WITH LONG-TERM CURRENT USE OF INSULIN (HCC): Primary | ICD-10-CM

## 2025-04-23 LAB — HBA1C MFR BLD: 6.3 %

## 2025-04-23 PROCEDURE — 99213 OFFICE O/P EST LOW 20 MIN: CPT | Performed by: INTERNAL MEDICINE

## 2025-04-23 PROCEDURE — PBSHW AMB POC HEMOGLOBIN A1C: Performed by: INTERNAL MEDICINE

## 2025-04-23 PROCEDURE — 3044F HG A1C LEVEL LT 7.0%: CPT | Performed by: INTERNAL MEDICINE

## 2025-04-23 PROCEDURE — 83036 HEMOGLOBIN GLYCOSYLATED A1C: CPT | Performed by: INTERNAL MEDICINE

## 2025-04-23 PROCEDURE — 2022F DILAT RTA XM EVC RTNOPTHY: CPT | Performed by: INTERNAL MEDICINE

## 2025-04-23 PROCEDURE — G8419 CALC BMI OUT NRM PARAM NOF/U: HCPCS | Performed by: INTERNAL MEDICINE

## 2025-04-23 PROCEDURE — 4004F PT TOBACCO SCREEN RCVD TLK: CPT | Performed by: INTERNAL MEDICINE

## 2025-04-23 PROCEDURE — 3017F COLORECTAL CA SCREEN DOC REV: CPT | Performed by: INTERNAL MEDICINE

## 2025-04-23 PROCEDURE — 1123F ACP DISCUSS/DSCN MKR DOCD: CPT | Performed by: INTERNAL MEDICINE

## 2025-04-23 PROCEDURE — G8427 DOCREV CUR MEDS BY ELIG CLIN: HCPCS | Performed by: INTERNAL MEDICINE

## 2025-04-23 PROCEDURE — 1160F RVW MEDS BY RX/DR IN RCRD: CPT | Performed by: INTERNAL MEDICINE

## 2025-04-23 PROCEDURE — 3046F HEMOGLOBIN A1C LEVEL >9.0%: CPT | Performed by: INTERNAL MEDICINE

## 2025-04-23 PROCEDURE — 1159F MED LIST DOCD IN RCRD: CPT | Performed by: INTERNAL MEDICINE

## 2025-04-23 RX ORDER — HYDROCHLOROTHIAZIDE 12.5 MG/1
CAPSULE ORAL
Qty: 6 EACH | Refills: 11 | Status: SHIPPED | OUTPATIENT
Start: 2025-04-23

## 2025-04-23 SDOH — ECONOMIC STABILITY: FOOD INSECURITY: WITHIN THE PAST 12 MONTHS, YOU WORRIED THAT YOUR FOOD WOULD RUN OUT BEFORE YOU GOT MONEY TO BUY MORE.: NEVER TRUE

## 2025-04-23 SDOH — ECONOMIC STABILITY: FOOD INSECURITY: WITHIN THE PAST 12 MONTHS, THE FOOD YOU BOUGHT JUST DIDN'T LAST AND YOU DIDN'T HAVE MONEY TO GET MORE.: NEVER TRUE

## 2025-04-23 ASSESSMENT — PATIENT HEALTH QUESTIONNAIRE - PHQ9
SUM OF ALL RESPONSES TO PHQ QUESTIONS 1-9: 0
2. FEELING DOWN, DEPRESSED OR HOPELESS: NOT AT ALL
SUM OF ALL RESPONSES TO PHQ QUESTIONS 1-9: 0
1. LITTLE INTEREST OR PLEASURE IN DOING THINGS: NOT AT ALL

## 2025-04-23 NOTE — PROGRESS NOTES
HISTORY OF PRESENT ILLNESS    Chief Complaint   Patient presents with    Follow-up       Presents for follow-up    He is exercising w [employer] program.  Wt Readings from Last 3 Encounters:   04/23/25 89.3 kg (196 lb 12.8 oz)   10/23/24 90.9 kg (200 lb 6.4 oz)   03/20/24 90.8 kg (200 lb 3.2 oz)     Diabetes Mellitus follow-up  Hemoglobin A1C   Date Value Ref Range Status   10/23/2024 6.4 (H) 4.0 - 5.6 % Final     Comment:     (NOTE)  HbA1C Interpretive Ranges  <5.7              Normal  5.7 - 6.4         Consider Prediabetes  >6.5              Consider Diabetes       Hemoglobin A1C, POC   Date Value Ref Range Status   04/23/2025 6.3 % Final    medication compliance: compliant all of the time.     Taking Jardiance 25 mg and Basaglar 10 units daily  Diabetic diet compliance: compliant most of the time.   Home glucose monitoring: using Micaela 3 CGM daily  30 day avg 132  30 day time in Target 96%, 4 % high  Hypoglycemic episodes:  rare, asymptomatic.  Further diabetic ROS: no polyuria or polydipsia, no chest pain, dyspnea or TIA's, no numbness, tingling or pain in extremities.     Hyperlipidemia  Currently he takes Lipitor 10 mg  ROS: taking medications as instructed, no medication side effects noted  No new myalgias, no joint pains, no weakness  No TIA's, no chest pain on exertion, no dyspnea on exertion, no swelling of ankles.   Lab Results   Component Value Date    CHOL 168 10/23/2024    TRIG 139 10/23/2024    HDL 52 10/23/2024    LDL 88.2 10/23/2024    VLDL 27.8 10/23/2024    CHOLHDLRATIO 3.2 10/23/2024             Review of Systems   All other systems reviewed and are negative, except as noted in HPI    Past Medical and Surgical History   has a past medical history of COVID-19, Diabetes mellitus type 2, controlled (HCC), Dyslipidemia, Erythrocytosis, Gout, Influenza vaccination declined by patient, Mild hypertension, and Osteoarthrosis of knee.     has a past surgical history that includes hernia repair; [place]

## 2025-05-14 ENCOUNTER — TELEPHONE (OUTPATIENT)
Facility: CLINIC | Age: 70
End: 2025-05-14

## 2025-05-14 NOTE — TELEPHONE ENCOUNTER
PA SUBMITTED FOR BASAGLAR WAS SUBMITTED VIA [employer] MY [employer], USE Key: [id number]. MESSAGE RECEIVE THAT PA WAS CANCELLED.

## 2025-05-24 ENCOUNTER — HOSPITAL ENCOUNTER (EMERGENCY)
Facility: HOSPITAL | Age: 70
Discharge: HOME | End: 2025-05-24
Attending: STUDENT IN AN ORGANIZED HEALTH CARE EDUCATION/TRAINING PROGRAM
Payer: MEDICARE

## 2025-05-24 VITALS
TEMPERATURE: 97.7 F | HEART RATE: 67 BPM | DIASTOLIC BLOOD PRESSURE: 95 MMHG | RESPIRATION RATE: 14 BRPM | WEIGHT: 189.7 LBS | HEIGHT: 72 IN | SYSTOLIC BLOOD PRESSURE: 136 MMHG | BODY MASS INDEX: 25.7 KG/M2 | OXYGEN SATURATION: 99 %

## 2025-05-24 DIAGNOSIS — S05.02XA CORNEA ABRASION, LEFT, INITIAL ENCOUNTER: Primary | ICD-10-CM

## 2025-05-24 PROCEDURE — 90714 TD VACC NO PRESV 7 YRS+ IM: CPT | Performed by: PHYSICIAN ASSISTANT

## 2025-05-24 PROCEDURE — 99284 EMERGENCY DEPT VISIT MOD MDM: CPT

## 2025-05-24 PROCEDURE — 90471 IMMUNIZATION ADMIN: CPT | Performed by: PHYSICIAN ASSISTANT

## 2025-05-24 PROCEDURE — 6360000002 HC RX W HCPCS: Performed by: PHYSICIAN ASSISTANT

## 2025-05-24 PROCEDURE — 6370000000 HC RX 637 (ALT 250 FOR IP): Performed by: PHYSICIAN ASSISTANT

## 2025-05-24 RX ORDER — ERYTHROMYCIN 5 MG/G
OINTMENT OPHTHALMIC
Qty: 1 G | Refills: 0 | Status: SHIPPED | OUTPATIENT
Start: 2025-05-24 | End: 2025-06-03

## 2025-05-24 RX ADMIN — FLUORESCEIN SODIUM 1 MG: 1 STRIP OPHTHALMIC at 12:56

## 2025-05-24 RX ADMIN — CLOSTRIDIUM TETANI TOXOID ANTIGEN (FORMALDEHYDE INACTIVATED) AND CORYNEBACTERIUM DIPHTHERIAE TOXOID ANTIGEN (FORMALDEHYDE INACTIVATED) 0.5 ML: 5; 2 INJECTION, SUSPENSION INTRAMUSCULAR at 12:57

## 2025-05-24 ASSESSMENT — LIFESTYLE VARIABLES
HOW MANY STANDARD DRINKS CONTAINING ALCOHOL DO YOU HAVE ON A TYPICAL DAY: 1 OR 2
HOW OFTEN DO YOU HAVE A DRINK CONTAINING ALCOHOL: MONTHLY OR LESS

## 2025-05-24 ASSESSMENT — ENCOUNTER SYMPTOMS
EYE DISCHARGE: 1
EYE ITCHING: 1
EYE REDNESS: 1
PHOTOPHOBIA: 0

## 2025-05-24 ASSESSMENT — PAIN SCALES - GENERAL: PAINLEVEL_OUTOF10: 0

## 2025-05-24 ASSESSMENT — PAIN - FUNCTIONAL ASSESSMENT
PAIN_FUNCTIONAL_ASSESSMENT: NONE - DENIES PAIN
PAIN_FUNCTIONAL_ASSESSMENT: 0-10

## 2025-05-24 NOTE — ED PROVIDER NOTES
[facility]  EMERGENCY DEPARTMENT ENCOUNTER      Pt Name: Aaron Perea  MRN: [id number]  Birthdate 1955  Date of evaluation: 5/24/2025  Provider: HEYDI Mckeon    CHIEF COMPLAINT       Chief Complaint   Patient presents with    Eye Problem         HISTORY OF PRESENT ILLNESS   (Location/Symptom, Timing/Onset, Context/Setting, Quality, Duration, Modifying Factors, Severity)  Note limiting factors.   68 yo male with medical history remarkable for DM presenting to the ED with complaint of left eye redness, irritation and clear drainage over the past week. Patient recalls turkey hunting prior to onset. No definitive trauma or FB in the eye. Has tried applying saline to the eye and eye lubricant which produces limited relief. This morning did some weed eating and wife noticed worsening of redness and swelling. No photophobia, headache, nausea or vision loss. Some blurry vision when tearing is present. Some mild sneezing.     The history is provided by the patient.         Review of External Medical Records:     Nursing Notes were reviewed.    REVIEW OF SYSTEMS    (2-9 systems for level 4, 10 or more for level 5)     Review of Systems   Eyes:  Positive for discharge, redness and itching. Negative for photophobia and visual disturbance.       Except as noted above the remainder of the review of systems was reviewed and negative.       PAST MEDICAL HISTORY     Past Medical History:   Diagnosis Date    COVID-19 01/28/2021, 6/2023    Diabetes mellitus type 2, controlled (HCC) 2015    Dyslipidemia 03/01/2018    Erythrocytosis 2019    Dr. Rios.  myeloproliferative work-up negative. JAK2 neg.    Gout 06/2020    Influenza vaccination declined by patient     Mild hypertension 06/01/2018    Osteoarthrosis of knee     saw Dr. Burgos 2019         SURGICAL HISTORY       Past Surgical History:   Procedure Laterality Date    COLONOSCOPY  02/18/2020    tubular adenoma.  repeat 5 years.  Dr. Campbell.  exam:     Left eye: Anterior chamber quiet. No foreign body.      Comments: Minor erythema to the left upper and lower eyelid   Cardiovascular:      Rate and Rhythm: Normal rate and regular rhythm.      Pulses: Normal pulses.   Pulmonary:      Effort: Pulmonary effort is normal.      Breath sounds: Normal breath sounds.   Abdominal:      General: There is no distension.   Musculoskeletal:         General: Normal range of motion.      Cervical back: Normal range of motion.   Skin:     General: Skin is warm.   Neurological:      General: No focal deficit present.      Mental Status: He is alert.   Psychiatric:         Mood and Affect: Mood normal.         Behavior: Behavior normal.         DIAGNOSTIC RESULTS     EKG: All EKG's are interpreted by the Emergency Department Physician who either signs or Co-signs this chart in the absence of a cardiologist.        RADIOLOGY:   Non-plain film images such as CT, Ultrasound and MRI are read by the radiologist. Plain radiographic images are visualized and preliminarily interpreted by the emergency physician with the below findings:        Interpretation per the Radiologist below, if available at the time of this note:    No orders to display        LABS:  Labs Reviewed - No data to display    All other labs were within normal range or not returned as of this dictation.    EMERGENCY DEPARTMENT COURSE and DIFFERENTIAL DIAGNOSIS/MDM:   Vitals:    Vitals:    05/24/25 1222   BP: (!) 136/95   Pulse: 67   Resp: 14   Temp: 97.7 °F (36.5 °C)   TempSrc: Oral   SpO2: 99%   Weight: 86 kg (189 lb 11.2 oz)   Height: 1.829 m (6')           Medical Decision Making  69-year-old male presenting with complaint of irritation to the left eye with associated itching and clear drainage episodically.  Physical exam reveals mildly erythematous upper and lower left eyelid with chemosis and small corneal abrasion to the left eye.  No foreign body noted no dendritic lesions, no Sidel sign.  Eye is soft

## 2025-05-24 NOTE — DISCHARGE INSTRUCTIONS
Apply erythromycin ointment as prescribed.  Follow-up with your eye doctor for repeat evaluation to ensure proper healing.  Return to the emergency department for worsening pain, decreased vision or any new or concerning symptoms to you.

## 2025-06-20 DIAGNOSIS — E11.9 TYPE 2 DIABETES MELLITUS WITHOUT COMPLICATION, WITH LONG-TERM CURRENT USE OF INSULIN (HCC): ICD-10-CM

## 2025-06-20 DIAGNOSIS — Z79.4 TYPE 2 DIABETES MELLITUS WITHOUT COMPLICATION, WITH LONG-TERM CURRENT USE OF INSULIN (HCC): ICD-10-CM

## 2025-06-22 RX ORDER — EMPAGLIFLOZIN 25 MG/1
25 TABLET, FILM COATED ORAL DAILY
Qty: 90 TABLET | Refills: 2 | Status: SHIPPED | OUTPATIENT
Start: 2025-06-22

## 2025-07-16 DIAGNOSIS — E78.5 DYSLIPIDEMIA: ICD-10-CM

## 2025-07-17 RX ORDER — ATORVASTATIN CALCIUM 10 MG/1
10 TABLET, FILM COATED ORAL DAILY
Qty: 180 TABLET | Refills: 1 | Status: SHIPPED | OUTPATIENT
Start: 2025-07-17